# Patient Record
Sex: MALE | Race: WHITE | Employment: FULL TIME | URBAN - METROPOLITAN AREA
[De-identification: names, ages, dates, MRNs, and addresses within clinical notes are randomized per-mention and may not be internally consistent; named-entity substitution may affect disease eponyms.]

---

## 2022-09-26 ENCOUNTER — INITIAL CONSULT (OUTPATIENT)
Dept: ONCOLOGY | Age: 32
End: 2022-09-26
Payer: COMMERCIAL

## 2022-09-26 ENCOUNTER — HOSPITAL ENCOUNTER (OUTPATIENT)
Dept: INFUSION THERAPY | Age: 32
Discharge: HOME | End: 2022-09-26
Payer: COMMERCIAL

## 2022-09-26 VITALS
HEIGHT: 75 IN | BODY MASS INDEX: 33.94 KG/M2 | DIASTOLIC BLOOD PRESSURE: 73 MMHG | WEIGHT: 273 LBS | RESPIRATION RATE: 18 BRPM | TEMPERATURE: 97.6 F | HEART RATE: 88 BPM | OXYGEN SATURATION: 98 % | SYSTOLIC BLOOD PRESSURE: 141 MMHG

## 2022-09-26 DIAGNOSIS — C62.11 MALIGNANT NEOPLASM OF DESCENDED RIGHT TESTIS (HCC): Primary | ICD-10-CM

## 2022-09-26 DIAGNOSIS — C62.11 MALIGNANT NEOPLASM OF DESCENDED RIGHT TESTIS (HCC): ICD-10-CM

## 2022-09-26 LAB
ALBUMIN SERPL-MCNC: 4.8 GM/DL (ref 3.4–5)
ALP BLD-CCNC: 86 IU/L (ref 40–128)
ALT SERPL-CCNC: 43 U/L (ref 10–40)
ANION GAP SERPL CALCULATED.3IONS-SCNC: 12 MMOL/L (ref 4–16)
AST SERPL-CCNC: 26 IU/L (ref 15–37)
BASOPHILS ABSOLUTE: 0 K/CU MM
BASOPHILS RELATIVE PERCENT: 0.4 % (ref 0–1)
BILIRUB SERPL-MCNC: 0.5 MG/DL (ref 0–1)
BUN BLDV-MCNC: 10 MG/DL (ref 6–23)
CALCIUM SERPL-MCNC: 9.9 MG/DL (ref 8.3–10.6)
CHLORIDE BLD-SCNC: 102 MMOL/L (ref 99–110)
CO2: 25 MMOL/L (ref 21–32)
CREAT SERPL-MCNC: 0.9 MG/DL (ref 0.9–1.3)
DIFFERENTIAL TYPE: ABNORMAL
EOSINOPHILS ABSOLUTE: 0.2 K/CU MM
EOSINOPHILS RELATIVE PERCENT: 4.3 % (ref 0–3)
GFR AFRICAN AMERICAN: >60 ML/MIN/1.73M2
GFR NON-AFRICAN AMERICAN: >60 ML/MIN/1.73M2
GLUCOSE BLD-MCNC: 90 MG/DL (ref 70–99)
HCT VFR BLD CALC: 47.7 % (ref 42–52)
HEMOGLOBIN: 16 GM/DL (ref 13.5–18)
LACTATE DEHYDROGENASE: 159 IU/L (ref 120–246)
LYMPHOCYTES ABSOLUTE: 2.5 K/CU MM
LYMPHOCYTES RELATIVE PERCENT: 45.5 % (ref 24–44)
MCH RBC QN AUTO: 28.8 PG (ref 27–31)
MCHC RBC AUTO-ENTMCNC: 33.5 % (ref 32–36)
MCV RBC AUTO: 85.9 FL (ref 78–100)
MONOCYTES ABSOLUTE: 0.5 K/CU MM
MONOCYTES RELATIVE PERCENT: 8.2 % (ref 0–4)
PDW BLD-RTO: 12.8 % (ref 11.7–14.9)
PLATELET # BLD: 304 K/CU MM (ref 140–440)
PMV BLD AUTO: 9.4 FL (ref 7.5–11.1)
POTASSIUM SERPL-SCNC: 4.6 MMOL/L (ref 3.5–5.1)
RBC # BLD: 5.55 M/CU MM (ref 4.6–6.2)
SEGMENTED NEUTROPHILS ABSOLUTE COUNT: 2.3 K/CU MM
SEGMENTED NEUTROPHILS RELATIVE PERCENT: 41.6 % (ref 36–66)
SODIUM BLD-SCNC: 139 MMOL/L (ref 135–145)
TOTAL PROTEIN: 7.5 GM/DL (ref 6.4–8.2)
WBC # BLD: 5.6 K/CU MM (ref 4–10.5)

## 2022-09-26 PROCEDURE — 82105 ALPHA-FETOPROTEIN SERUM: CPT

## 2022-09-26 PROCEDURE — 99211 OFF/OP EST MAY X REQ PHY/QHP: CPT

## 2022-09-26 PROCEDURE — 85025 COMPLETE CBC W/AUTO DIFF WBC: CPT

## 2022-09-26 PROCEDURE — 36415 COLL VENOUS BLD VENIPUNCTURE: CPT

## 2022-09-26 PROCEDURE — 99215 OFFICE O/P EST HI 40 MIN: CPT | Performed by: INTERNAL MEDICINE

## 2022-09-26 PROCEDURE — 80053 COMPREHEN METABOLIC PANEL: CPT

## 2022-09-26 PROCEDURE — 83615 LACTATE (LD) (LDH) ENZYME: CPT

## 2022-09-26 PROCEDURE — 84702 CHORIONIC GONADOTROPIN TEST: CPT

## 2022-09-26 RX ORDER — OMEGA-3 FATTY ACIDS/FISH OIL 300-1000MG
1 CAPSULE ORAL PRN
COMMUNITY

## 2022-09-26 RX ORDER — LORATADINE 10 MG/1
10 TABLET ORAL PRN
COMMUNITY

## 2022-09-26 ASSESSMENT — PATIENT HEALTH QUESTIONNAIRE - PHQ9
2. FEELING DOWN, DEPRESSED OR HOPELESS: 0
SUM OF ALL RESPONSES TO PHQ QUESTIONS 1-9: 0
SUM OF ALL RESPONSES TO PHQ9 QUESTIONS 1 & 2: 0
SUM OF ALL RESPONSES TO PHQ QUESTIONS 1-9: 0
1. LITTLE INTEREST OR PLEASURE IN DOING THINGS: 0
SUM OF ALL RESPONSES TO PHQ QUESTIONS 1-9: 0
SUM OF ALL RESPONSES TO PHQ QUESTIONS 1-9: 0

## 2022-09-26 NOTE — PROGRESS NOTES
MA Rooming Questions  Patient: Deedee Henry  MRN: [id number]    Date: 9/26/2022        NP      5. Did the patient have a depression screening completed today?  Yes    PHQ-9 Total Score: 0 (9/26/2022  9:11 AM)       PHQ-9 Given to (if applicable):               PHQ-9 Score (if applicable):                     [] Positive     [x]  Negative              Does question #9 need addressed (if applicable)                     [] Yes    []  No               Alejandra Sierra MA

## 2022-09-26 NOTE — PROGRESS NOTES
Patient Name:  Abeba Rivera  Patient :  1990  Patient MRN:  [id number]     Primary Oncologist: Fatemeh Merchant MD  Referring Provider: No primary care provider on file. Date of Service: 2022      Reason for Consult: To evaluate the patient with mixed germ cell tumor. 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 He initially presented with right testicular pain on last week of 2022. Subsequent US scrotum on 22 showed 35 mm right testicular mass. His tumor marker before surgery on 22 were, AFP 6, beta HCG less than 5 and . 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 Tumor size was 3.5 cm in greatest dimension and confined to testis [pT1]. All the surgical margins are negative. There is no lymphovascular invasion. CT scan of the chest, abdomen and pelvis done on 2022 showed no focal forward CT findings of intra-abdominal or pelvic metastatic disease. Hepatic steatosis. He was subsequently referred to me for further evaluation. Past Medical History:     No pertinent medical history. Past Surgery History:    Significant for  1. Septoplasty  2. [place] teeth removal  3. Right orchiectomy    Social History:   He denies smoking or illicit drug abuse. He socially drinks alcohol [weekly]. Family History:    Significant for colon cancer in his maternal grandfather, prostate cancer and possibly renal cell carcinoma in his paternal grandfather. His paternal uncle has prostate cancer and testicular cancer.      Allergies:  No known drug allergy. Current Outpatient Medications on File Prior to Visit   Medication Sig Dispense Refill    loratadine (CLARITIN) 10 MG tablet Take 10 mg by mouth as needed      ibuprofen (ADVIL;MOTRIN) 200 MG CAPS Take 1 capsule by mouth as needed for Fever       No current facility-administered medications on file prior to visit. Review of Systems:  Constitutional:  No weight loss, No fever, No chills, No night sweats. Energy level good. Eyes:  No diplopia, No transient or permanent loss of vision, No scotomata. ENT / Mouth:  No epistaxis, No dysphagia, No hoarseness, No oral ulcers, No gingival bleeding. No sore throat, No postnasal drip, No nasal drip, No mouth pain, No sinus pain, No tinnitus, Normal hearing. Cardiovascular:  No chest pain, No palpitations, No syncope, No upper extremity edema, No lower extremity edema, No calf discomfort. Respiratory:  No cough. No hemoptysis, No pleurisy, No wheezing, No dyspnea. Gastrointestinal:  No abdominal pain, No abdominal cramping, No nausea, No vomiting, No constipation, No diarrhea, No hematochezia, No melena, No jaundice, No dyspepsia, No dysphagia. Urinary:  No dysuria, No hematuria, No urinary incontinence. Musculoskeletal:  No muscle pain, No swollen joints, No joint redness, No bone pain, No spine tenderness. Skin:  No rash, No nodules, No pruritus, No lesions. Neurologic:  No confusion, No seizures, No syncope, No tremor, No speech change, No headache, No hiccups, No abnormal gait, No sensory changes, No weakness. Psychiatric:  No depression, No anxiety, Concentration normal.  Endocrine:  No polyuria, No polydipsia, No hot flashes, No thyroid symptoms. Hematologic:  No epistaxis, No gingival bleeding, No petechiae, No ecchymosis. Lymphatic:  No lymphadenopathy, No lymphedema. Allergy / Immunologic:  No eczema, No frequent mucous infections, No frequent respiratory infections, No recurrent urticarial, No frequent skin infections. Vital Signs: BP (!) 141/73 (Site: Right Upper Arm, Position: Sitting, Cuff Size: Large Adult)   Pulse 88   Temp 97.6 °F (36.4 °C) (Infrared)   Resp 18   Ht 6' 3\" (1.905 m)   Wt 273 lb (123.8 kg)   SpO2 98%   BMI 34.12 kg/m²      Physical Exam:  CONSTITUTIONAL: awake, alert, cooperative, no apparent distress   EYES: pupils equal, round and reactive to light, sclera clear, normal conjunctiva  ENT: Normocephalic, without obvious abnormality, atraumatic  NECK: supple, symmetrical, no jugular venous distension, no carotid bruits   HEMATOLOGIC/LYMPHATIC: no cervical, supraclavicular or axillary lymphadenopathy   LUNGS: VBS, no wheezes, no increased work of breathing, no rhonchi, clear to auscultation, no crackles,    CARDIOVASCULAR: regular rate and rhythm, normal S1 and S2, no murmur noted  ABDOMEN: normal bowel sounds x 4, soft, non-distended, non-tender, no masses palpated, no hepatosplenomegaly   MUSCULOSKELETAL: full range of motion noted, tone is normal  NEUROLOGIC: awake, alert, oriented to name, place and time. Motor skills grossly intact. SKIN: appears intact, normal skin color, normal texture, normal turgor, no jaundice.    EXTREMITIES: no LE edema, no leg swelling, no cyanosis, no clubbing,       Labs:  Hematology:  No results found for: WBC, RBC, HGB, HCT, MCV, MCH, MCHC, RDW, PLT, MPV, BANDSPCT, SEGSPCT, EOSRELPCT, BASOPCT, LYMPHOPCT, MONOPCT, BANDABS, SEGSABS, EOSABS, BASOSABS, LYMPHSABS, MONOSABS, DIFFTYPE, ANISOCYTOSIS, POLYCHROM, WBCMORP, PLTM  No results found for: ESR  Chemistry:  No results found for: NA, K, CL, CO2, BUN, CREATININE, GLUCOSE, CALCIUM, PROT, LABALBU, BILITOT, ALKPHOS, AST, ALT, LABGLOM, GFRAA, AGRATIO, GLOB, PHOS, MG, POCCA, POCGLU  No results found for: MMA, LDH, HOMOCYSTEINE  No components found for: LD  No results found for: TSHHS, T4FREE, FT3  Immunology:  No results found for: PROT, SPEP, ALBUMINELP, LABALPH, LABBETA, GAMGLOB  No results found for: Ok Varner, KLFLCR  No results found for: B2M  Coagulation Panel:  No results found for: PROTIME, INR, APTT, DDIMER  Anemia Panel:  No results found for: [id number], FOLATE  Tumor Markers:  No results found for: , CEA, , LABCA2, PSA     Observations:  PHQ-9 Total Score: 0 (9/26/2022  9:11 AM)     Assessment   Right testicular mixed germ cell tumor     Plan:  Wilder AndradeIrasema Gonzalez is a 41-year-old very pleasant gentleman who initially presented with right testicular pain on last week of July 2022. Subsequent US scrotum on 8/22/22 showed 35 mm right testicular mass. His tumor marker before surgery on 8/24/22 were, AFP 6, beta HCG less than 5 and . 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 Tumor size was 3.5 cm in greatest dimension and confined to testis [pT1]. All the surgical margins are negative. There is no lymphovascular invasion. CT scan of the chest, abdomen and pelvis done on 9/20/2022 showed no focal forward CT findings of intra-abdominal or pelvic metastatic disease. Hepatic steatosis. 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 Since he only has stage I disease without risk factor, I recommend him to consider for surveillance. I also discussed with him about one cycle of chemotherapy with BEP as well as nerve-sparing RPLND. After long discussion with him and his father, he has decided for surveillance. He will need history, physical and tumor marker every 2 months in first year, every 2 months in second year, every 4-6 months in third year, every 6 months in fourth year and yearly in fifth year.  He will need CT abdomen/pelvis in every 4-6 months in first year, every 6 months in second year and annually in third year and as clinically indicated after that. He will need chest x ray in 4 months and 12 months in first year, annually in second, third and fourth year, and as clinically indicated after that. I will repeat his post orchiectomy tumor marker today and will follow up with the results. If they are negative, will plan to have repeat labs in 2 months. I answered all his questions and concerns for today. Thank you for allowing me to participate in the care of this very pleasant patient. Recent imaging and labs were reviewed and discussed with the patient.

## 2022-09-28 LAB — HCG TUMOR MARKER: <1 IU/L (ref 0–3)

## 2022-09-29 LAB — MS ALPHA-FETOPROTEIN: 4 NG/ML (ref 0–9)

## 2022-10-08 NOTE — PROGRESS NOTES
Patient Name:  Earnest Liang  Patient :  1990  Patient MRN:  [id number]     Primary Oncologist: Fabrizio Lemon MD  Referring Provider: No primary care provider on file. 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 He initially presented with right testicular pain on last week of 2022. Subsequent US scrotum on 22 showed 35 mm right testicular mass. His tumor marker before surgery on 22 were, AFP 6, beta HCG less than 5 and . 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 Tumor size was 3.5 cm in greatest dimension and confined to testis [pT1]. All the surgical margins are negative. There is no lymphovascular invasion. CT scan of the chest, abdomen and pelvis done on 2022 showed no focal forward CT findings of intra-abdominal or pelvic metastatic disease. Hepatic steatosis. 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 Since he only has stage I disease without risk factor, I recommend him to consider for surveillance. I also discussed with him about one cycle of chemotherapy with BEP as well as nerve-sparing RPLND. After long discussion with him and his father, he has decided for surveillance.      He will need history, physical and tumor marker every 2 months in first year, every 2 months in second year, every 4-6 months in third year, every 6 months in fourth year and yearly in fifth year. He will need CT abdomen/pelvis in every 4-6 months in first year, every 6 months in second year and annually in third year and as clinically indicated after that. He will need chest x ray in 4 months and 12 months in first year, annually in second, third and fourth year, and as clinically indicated after that. On October 12, 2022, he presented to me for follow-up. 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 History tumor marker done on 9/26/2022 (after surgery) were within normal range. I recommended to have repeat tumor markers in 2 months and I will see him again after that. I will continue to follow him as recommended by NCCN guidelines. He does not have any significant symptoms at today visit. Past Medical History:     No pertinent medical history. Past Surgery History:    Significant for  1. Septoplasty  2. [place] teeth removal  3. Right orchiectomy    Social History:   He denies smoking or illicit drug abuse. He socially drinks alcohol [weekly]. Family History:    Significant for colon cancer in his maternal grandfather, prostate cancer and possibly renal cell carcinoma in his paternal grandfather. His paternal uncle has prostate cancer and testicular cancer. Allergies:  No known drug allergy     Review of Systems: \"Per interval history; otherwise 10 point ROS is negative. \"  His energy level is pretty good and his sleep is fine. He doesn't have fever, chills, night sweats, cough, shortness of breath, chest pain, hemoptysis or palpitation. His bowels and bladder functions are normal. He denies nausea, vomiting, abdominal pain, diarrhea, constipation, dysuria, loss of appetite or weight loss. He doesn't have neuropathy and he denies bleeding or clotting issues. He doesn't have any pain in his body. Denies anxiety or depression. The rest of the systems are unremarkable.      Vital Signs: BP (!) 151/88 (Site: Right Upper Arm, Position: Sitting, Cuff Size: Large Adult)   Pulse (!) 114   Temp 98.1 °F (36.7 °C) (Infrared)   Resp 18   Ht 6' 3\" (1.905 m)   Wt 272 lb (123.4 kg)   SpO2 98%   BMI 34.00 kg/m²      Physical Exam:  CONSTITUTIONAL: awake, alert, cooperative, no apparent distress   EYES: pupils equal, round and reactive to light, sclera clear, normal conjunctiva  ENT: Normocephalic, without obvious abnormality, atraumatic  NECK: supple, symmetrical, no jugular venous distension, no carotid bruits   HEMATOLOGIC/LYMPHATIC: no cervical, supraclavicular or axillary lymphadenopathy   LUNGS: VBS, no wheezes, no increased work of breathing, no rhonchi, clear to auscultation, no crackles,    CARDIOVASCULAR: regular rate and rhythm, normal S1 and S2, no murmur noted  ABDOMEN: normal bowel sounds x 4, soft, non-distended, non-tender, no masses palpated, no hepatosplenomegaly   MUSCULOSKELETAL: full range of motion noted, tone is normal  NEUROLOGIC: awake, alert, oriented to name, place and time. Motor skills grossly intact. SKIN: appears intact, normal skin color, normal texture, normal turgor, no jaundice.    EXTREMITIES: no LE edema, no clubbing, no leg swelling, no cyanosis,        Labs:  Hematology:  Lab Results   Component Value Date    WBC 5.6 09/26/2022    RBC 5.55 09/26/2022    HGB 16.0 09/26/2022    HCT 47.7 09/26/2022    MCV 85.9 09/26/2022    MCH 28.8 09/26/2022    MCHC 33.5 09/26/2022    RDW 12.8 09/26/2022     09/26/2022    MPV 9.4 09/26/2022    SEGSPCT 41.6 09/26/2022    EOSRELPCT 4.3 (H) 09/26/2022    BASOPCT 0.4 09/26/2022    LYMPHOPCT 45.5 (H) 09/26/2022    MONOPCT 8.2 (H) 09/26/2022    SEGSABS 2.3 09/26/2022    EOSABS 0.2 09/26/2022    BASOSABS 0.0 09/26/2022    LYMPHSABS 2.5 09/26/2022    MONOSABS 0.5 09/26/2022    DIFFTYPE AUTOMATED DIFFERENTIAL 09/26/2022     No results found for: ESR  Chemistry:  Lab Results   Component Value Date     09/26/2022    K 4.6 09/26/2022  09/26/2022    CO2 25 09/26/2022    BUN 10 09/26/2022    CREATININE 0.9 09/26/2022    GLUCOSE 90 09/26/2022    CALCIUM 9.9 09/26/2022    PROT 7.5 09/26/2022    LABALBU 4.8 09/26/2022    BILITOT 0.5 09/26/2022    ALKPHOS 86 09/26/2022    AST 26 09/26/2022    ALT 43 (H) 09/26/2022    LABGLOM >60 09/26/2022    GFRAA >60 09/26/2022     Lab Results   Component Value Date     09/26/2022     No components found for: LD  No results found for: TSHHS, T4FREE, FT3  Immunology:  Lab Results   Component Value Date    PROT 7.5 09/26/2022     No results found for: Texie Miracle, KLFLCR  No results found for: B2M  Coagulation Panel:  No results found for: PROTIME, INR, APTT, DDIMER  Anemia Panel:  No results found for: [id number], FOLATE  Tumor Markers:  No results found for: , CEA, , LABCA2, PSA     Observations:  No data recorded     Assessment   Right testicular mixed germ cell tumor     Plan:  Emilee Carringtonjihan Gonzalez is a 81-year-old very pleasant gentleman who initially presented with right testicular pain on last week of July 2022. Subsequent US scrotum on 8/22/22 showed 35 mm right testicular mass. His tumor marker before surgery on 8/24/22 were, AFP 6, beta HCG less than 5 and . 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 Tumor size was 3.5 cm in greatest dimension and confined to testis [pT1]. All the surgical margins are negative. There is no lymphovascular invasion. CT scan of the chest, abdomen and pelvis done on 9/20/2022 showed no focal forward CT findings of intra-abdominal or pelvic metastatic disease. Hepatic steatosis. 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     Since he only has stage I disease without risk factor, I recommend him to consider for surveillance. I also discussed with him about one cycle of chemotherapy with BEP as well as nerve-sparing RPLND. After long discussion with him and his father, he has decided for surveillance. He will need history, physical and tumor marker every 2 months in first year, every 2 months in second year, every 4-6 months in third year, every 6 months in fourth year and yearly in fifth year. He will need CT abdomen/pelvis in every 4-6 months in first year, every 6 months in second year and annually in third year and as clinically indicated after that. He will need chest x ray in 4 months and 12 months in first year, annually in second, third and fourth year, and as clinically indicated after that. On October 12, 2022, he presented to me for follow-up. 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 History tumor marker done on 9/26/2022 (after surgery) were within normal range. I recommended to have repeat tumor markers in 2 months and I will see him again after that. I will continue to follow him as recommended by NCCN guidelines. I answered all his questions and concerns for today. Recent imaging and labs were reviewed and discussed with the patient.

## 2022-10-12 ENCOUNTER — HOSPITAL ENCOUNTER (OUTPATIENT)
Dept: INFUSION THERAPY | Age: 32
Discharge: HOME | End: 2022-10-12
Payer: COMMERCIAL

## 2022-10-12 ENCOUNTER — OFFICE VISIT (OUTPATIENT)
Dept: ONCOLOGY | Age: 32
End: 2022-10-12
Payer: COMMERCIAL

## 2022-10-12 VITALS
TEMPERATURE: 98.1 F | HEART RATE: 114 BPM | OXYGEN SATURATION: 98 % | RESPIRATION RATE: 18 BRPM | BODY MASS INDEX: 33.82 KG/M2 | DIASTOLIC BLOOD PRESSURE: 88 MMHG | SYSTOLIC BLOOD PRESSURE: 151 MMHG | HEIGHT: 75 IN | WEIGHT: 272 LBS

## 2022-10-12 DIAGNOSIS — C62.11 MALIGNANT NEOPLASM OF DESCENDED RIGHT TESTIS (HCC): Primary | ICD-10-CM

## 2022-10-12 PROCEDURE — 99211 OFF/OP EST MAY X REQ PHY/QHP: CPT

## 2022-10-12 PROCEDURE — 99213 OFFICE O/P EST LOW 20 MIN: CPT | Performed by: INTERNAL MEDICINE

## 2022-10-12 NOTE — PROGRESS NOTES
MA Rooming Questions  Patient: Edilia Link  MRN: [id number]    Date: 10/12/2022        1. Do you have any new issues?   no         2. Do you need any refills on medications?    no    3. Have you had any imaging done since your last visit?   no    4. Have you been hospitalized or seen in the emergency room since your last visit here?   no    5. Did the patient have a depression screening completed today?  No    No data recorded     PHQ-9 Given to (if applicable):               PHQ-9 Score (if applicable):                     [] Positive     []  Negative              Does question #9 need addressed (if applicable)                     [] Yes    []  No               Adilene Pickens MA

## 2022-12-06 ENCOUNTER — HOSPITAL ENCOUNTER (OUTPATIENT)
Dept: INFUSION THERAPY | Age: 32
Discharge: HOME | End: 2022-12-06
Payer: COMMERCIAL

## 2022-12-06 DIAGNOSIS — C62.11 MALIGNANT NEOPLASM OF DESCENDED RIGHT TESTIS (HCC): ICD-10-CM

## 2022-12-06 LAB
ALBUMIN SERPL-MCNC: 4.7 GM/DL (ref 3.4–5)
ALP BLD-CCNC: 74 IU/L (ref 40–129)
ALT SERPL-CCNC: 36 U/L (ref 10–40)
ANION GAP SERPL CALCULATED.3IONS-SCNC: 12 MMOL/L (ref 4–16)
AST SERPL-CCNC: 23 IU/L (ref 15–37)
BASOPHILS ABSOLUTE: 0 K/CU MM
BASOPHILS RELATIVE PERCENT: 0.7 % (ref 0–1)
BILIRUB SERPL-MCNC: 0.5 MG/DL (ref 0–1)
BUN BLDV-MCNC: 10 MG/DL (ref 6–23)
CALCIUM SERPL-MCNC: 9.8 MG/DL (ref 8.3–10.6)
CHLORIDE BLD-SCNC: 102 MMOL/L (ref 99–110)
CO2: 27 MMOL/L (ref 21–32)
CREAT SERPL-MCNC: 0.8 MG/DL (ref 0.9–1.3)
DIFFERENTIAL TYPE: ABNORMAL
EOSINOPHILS ABSOLUTE: 0.2 K/CU MM
EOSINOPHILS RELATIVE PERCENT: 3.9 % (ref 0–3)
GFR SERPL CREATININE-BSD FRML MDRD: >60 ML/MIN/1.73M2
GLUCOSE BLD-MCNC: 90 MG/DL (ref 70–99)
HCT VFR BLD CALC: 47.5 % (ref 42–52)
HEMOGLOBIN: 15.9 GM/DL (ref 13.5–18)
LACTATE DEHYDROGENASE: 154 IU/L (ref 120–246)
LYMPHOCYTES ABSOLUTE: 2.9 K/CU MM
LYMPHOCYTES RELATIVE PERCENT: 46.9 % (ref 24–44)
MCH RBC QN AUTO: 28.8 PG (ref 27–31)
MCHC RBC AUTO-ENTMCNC: 33.5 % (ref 32–36)
MCV RBC AUTO: 85.9 FL (ref 78–100)
MONOCYTES ABSOLUTE: 0.5 K/CU MM
MONOCYTES RELATIVE PERCENT: 7.5 % (ref 0–4)
PDW BLD-RTO: 12.6 % (ref 11.7–14.9)
PLATELET # BLD: 313 K/CU MM (ref 140–440)
PMV BLD AUTO: 9.3 FL (ref 7.5–11.1)
POTASSIUM SERPL-SCNC: 4.6 MMOL/L (ref 3.5–5.1)
RBC # BLD: 5.53 M/CU MM (ref 4.6–6.2)
SEGMENTED NEUTROPHILS ABSOLUTE COUNT: 2.5 K/CU MM
SEGMENTED NEUTROPHILS RELATIVE PERCENT: 41 % (ref 36–66)
SODIUM BLD-SCNC: 141 MMOL/L (ref 135–145)
TOTAL PROTEIN: 7.5 GM/DL (ref 6.4–8.2)
WBC # BLD: 6.1 K/CU MM (ref 4–10.5)

## 2022-12-06 PROCEDURE — 85025 COMPLETE CBC W/AUTO DIFF WBC: CPT

## 2022-12-06 PROCEDURE — 82105 ALPHA-FETOPROTEIN SERUM: CPT

## 2022-12-06 PROCEDURE — 36415 COLL VENOUS BLD VENIPUNCTURE: CPT

## 2022-12-06 PROCEDURE — 84702 CHORIONIC GONADOTROPIN TEST: CPT

## 2022-12-06 PROCEDURE — 83615 LACTATE (LD) (LDH) ENZYME: CPT

## 2022-12-06 PROCEDURE — 80053 COMPREHEN METABOLIC PANEL: CPT

## 2022-12-08 LAB
HCG TUMOR MARKER: <1 IU/L (ref 0–3)
MS ALPHA-FETOPROTEIN: 4 NG/ML (ref 0–9)

## 2022-12-13 ENCOUNTER — OFFICE VISIT (OUTPATIENT)
Dept: ONCOLOGY | Age: 32
End: 2022-12-13
Payer: COMMERCIAL

## 2022-12-13 ENCOUNTER — HOSPITAL ENCOUNTER (OUTPATIENT)
Dept: INFUSION THERAPY | Age: 32
Discharge: HOME | End: 2022-12-13
Payer: COMMERCIAL

## 2022-12-13 VITALS
RESPIRATION RATE: 18 BRPM | HEART RATE: 83 BPM | HEIGHT: 75 IN | TEMPERATURE: 98.1 F | OXYGEN SATURATION: 96 % | BODY MASS INDEX: 33.94 KG/M2 | DIASTOLIC BLOOD PRESSURE: 88 MMHG | WEIGHT: 273 LBS | SYSTOLIC BLOOD PRESSURE: 140 MMHG

## 2022-12-13 DIAGNOSIS — C62.11 MALIGNANT NEOPLASM OF DESCENDED RIGHT TESTIS (HCC): Primary | ICD-10-CM

## 2022-12-13 PROCEDURE — 99213 OFFICE O/P EST LOW 20 MIN: CPT | Performed by: INTERNAL MEDICINE

## 2022-12-13 PROCEDURE — 99211 OFF/OP EST MAY X REQ PHY/QHP: CPT

## 2022-12-13 NOTE — PROGRESS NOTES
MA Rooming Questions  Patient: Earnest Liang  MRN: [id number]    Date: 12/13/2022        1. Do you have any new issues?   no         2. Do you need any refills on medications?    no    3. Have you had any imaging done since your last visit?   no    4. Have you been hospitalized or seen in the emergency room since your last visit here?   no    5. Did the patient have a depression screening completed today?  No    No data recorded     PHQ-9 Given to (if applicable):               PHQ-9 Score (if applicable):                     [] Positive     []  Negative              Does question #9 need addressed (if applicable)                     [] Yes    []  No               Mark Benavides MA

## 2022-12-13 NOTE — PROGRESS NOTES
Patient Name:  Tamara Abreu  Patient :  1990  Patient MRN:  [id number]     Primary Oncologist: Corrine Wright MD  Referring Provider: No primary care provider on file. 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 He initially presented with right testicular pain on last week of 2022. Subsequent US scrotum on 22 showed 35 mm right testicular mass. His tumor marker before surgery on 22 were, AFP 6, beta HCG less than 5 and . 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 Tumor size was 3.5 cm in greatest dimension and confined to testis [pT1]. All the surgical margins are negative. There is no lymphovascular invasion. CT scan of the chest, abdomen and pelvis done on 2022 showed no focal forward CT findings of intra-abdominal or pelvic metastatic disease. Hepatic steatosis. 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 Since he only has stage I disease without risk factor, I recommend him to consider for surveillance. I also discussed with him about one cycle of chemotherapy with BEP as well as nerve-sparing RPLND. After long discussion with him and his father, he has decided for surveillance.      He will need history, physical and tumor marker every 2 months in first year, every 2 months in second year, every 4-6 months in third year, every 6 months in fourth year and yearly in fifth year. He will need CT abdomen/pelvis in every 4-6 months in first year, every 6 months in second year and annually in third year and as clinically indicated after that. He will need chest x ray in 4 months and 12 months in first year, annually in second, third and fourth year, and as clinically indicated after that. On December 13, 2022, he presented to me for follow-up. 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 History tumor marker done on 12/16/2022 were within normal range. I recommended to have repeat tumor markers in 2 months. He will be due for CT abdomen/pelvis and CXR (5 months after initial scan) in 2 months and I will schedule for them. I will see him again after that. I will continue to follow him as recommended by NCCN guidelines. He does not have any significant symptoms at today visit. Past Medical History:     No pertinent medical history. Past Surgery History:    Significant for  1. Septoplasty  2. [place] teeth removal  3. Right orchiectomy    Social History:   He denies smoking or illicit drug abuse. He socially drinks alcohol [weekly]. Family History:    Significant for colon cancer in his maternal grandfather, prostate cancer and possibly renal cell carcinoma in his paternal grandfather. His paternal uncle has prostate cancer and testicular cancer. Allergies:  No known drug allergy     Review of Systems: \"Per interval history; otherwise 10 point ROS is negative. \"  His energy level is good and his sleep is fine. He doesn't have fever, chills, night sweats, cough, shortness of breath, chest pain, hemoptysis or palpitation. His bowels and bladder functions are normal. He denies nausea, vomiting, abdominal pain, diarrhea, constipation, dysuria, loss of appetite or weight loss. He doesn't have neuropathy and he denies bleeding or clotting issues. He denies any pain in his body.  No anxiety or depression. The rest of the systems are unremarkable. Vital Signs: BP (!) 140/88 (Site: Right Upper Arm, Position: Sitting, Cuff Size: Large Adult)   Pulse 83   Temp 98.1 °F (36.7 °C) (Infrared)   Resp 18   Ht 6' 3\" (1.905 m)   Wt 273 lb (123.8 kg)   SpO2 96%   BMI 34.12 kg/m²      Physical Exam:  CONSTITUTIONAL: awake, alert, cooperative, no apparent distress   EYES: pupils equal, round and reactive to light, sclera clear, normal conjunctiva  ENT: Normocephalic, without obvious abnormality, atraumatic  NECK: supple, symmetrical, no jugular venous distension, no carotid bruits   HEMATOLOGIC/LYMPHATIC: no cervical, supraclavicular or axillary lymphadenopathy   LUNGS: VBS, no wheezes, no increased work of breathing, no rhonchi, clear to auscultation, no crackles,    CARDIOVASCULAR: regular rate and rhythm, normal S1 and S2, no murmur noted  ABDOMEN: normal bowel sounds x 4, soft, non-distended, non-tender, no masses palpated, no hepatosplenomegaly   MUSCULOSKELETAL: full range of motion noted, tone is normal  NEUROLOGIC: awake, alert, oriented to name, place and time. Motor skills grossly intact. SKIN: appears intact, normal skin color, normal texture, normal turgor, no jaundice.    EXTREMITIES: no clubbing, no leg swelling, no LE edema, no cyanosis,        Labs:  Hematology:  Lab Results   Component Value Date    WBC 6.1 12/06/2022    RBC 5.53 12/06/2022    HGB 15.9 12/06/2022    HCT 47.5 12/06/2022    MCV 85.9 12/06/2022    MCH 28.8 12/06/2022    MCHC 33.5 12/06/2022    RDW 12.6 12/06/2022     12/06/2022    MPV 9.3 12/06/2022    SEGSPCT 41.0 12/06/2022    EOSRELPCT 3.9 (H) 12/06/2022    BASOPCT 0.7 12/06/2022    LYMPHOPCT 46.9 (H) 12/06/2022    MONOPCT 7.5 (H) 12/06/2022    SEGSABS 2.5 12/06/2022    EOSABS 0.2 12/06/2022    BASOSABS 0.0 12/06/2022    LYMPHSABS 2.9 12/06/2022    MONOSABS 0.5 12/06/2022    DIFFTYPE AUTOMATED DIFFERENTIAL 12/06/2022     No results found for: ESR  Chemistry:  Lab Results   Component Value Date     12/06/2022    K 4.6 12/06/2022     12/06/2022    CO2 27 12/06/2022    BUN 10 12/06/2022    CREATININE 0.8 (L) 12/06/2022    GLUCOSE 90 12/06/2022    CALCIUM 9.8 12/06/2022    PROT 7.5 12/06/2022    LABALBU 4.7 12/06/2022    BILITOT 0.5 12/06/2022    ALKPHOS 74 12/06/2022    AST 23 12/06/2022    ALT 36 12/06/2022    LABGLOM >60 12/06/2022    GFRAA >60 09/26/2022     Lab Results   Component Value Date     12/06/2022     No components found for: LD  No results found for: TSHHS, T4FREE, FT3  Immunology:  Lab Results   Component Value Date    PROT 7.5 12/06/2022     No results found for: Matilda Macanese, KLFLCR  No results found for: B2M  Coagulation Panel:  No results found for: PROTIME, INR, APTT, DDIMER  Anemia Panel:  No results found for: [id number], FOLATE  Tumor Markers:  No results found for: , CEA, , LABCA2, PSA     Observations:  No data recorded     Assessment   Right testicular mixed germ cell tumor     Plan:  Savannah Gonzalez is a 26-year-old very pleasant gentleman who initially presented with right testicular pain on last week of July 2022. Subsequent US scrotum on 8/22/22 showed 35 mm right testicular mass. His tumor marker before surgery on 8/24/22 were, AFP 6, beta HCG less than 5 and . 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 Tumor size was 3.5 cm in greatest dimension and confined to testis [pT1]. All the surgical margins are negative. There is no lymphovascular invasion. CT scan of the chest, abdomen and pelvis done on 9/20/2022 showed no focal forward CT findings of intra-abdominal or pelvic metastatic disease. Hepatic steatosis.     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 Since he only has stage I disease without risk factor, I recommend him to consider for surveillance. I also discussed with him about one cycle of chemotherapy with BEP as well as nerve-sparing RPLND. After long discussion with him and his father, he has decided for surveillance. He will need history, physical and tumor marker every 2 months in first year, every 2 months in second year, every 4-6 months in third year, every 6 months in fourth year and yearly in fifth year. He will need CT abdomen/pelvis in every 4-6 months in first year, every 6 months in second year and annually in third year and as clinically indicated after that. He will need chest x ray in 4 months and 12 months in first year, annually in second, third and fourth year, and as clinically indicated after that. On December 13, 2022, he presented to me for follow-up. 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 History tumor marker done on 12/16/2022 were within normal range. I recommended to have repeat tumor markers in 2 months. He will be due for CT abdomen/pelvis and CXR (5 months after initial scan) in 2 months and I will schedule for them. I will see him again after that. I will continue to follow him as recommended by NCCN guidelines. I answered all his questions and concerns for today. Recent imaging and labs were reviewed and discussed with the patient.

## 2023-02-02 ENCOUNTER — CLINICAL DOCUMENTATION (OUTPATIENT)
Dept: ONCOLOGY | Age: 33
End: 2023-02-02

## 2023-02-14 ENCOUNTER — HOSPITAL ENCOUNTER (OUTPATIENT)
Dept: INFUSION THERAPY | Age: 33
Discharge: HOME | End: 2023-02-14
Payer: COMMERCIAL

## 2023-02-14 DIAGNOSIS — C62.11 MALIGNANT NEOPLASM OF DESCENDED RIGHT TESTIS (HCC): ICD-10-CM

## 2023-02-14 LAB
ALBUMIN SERPL-MCNC: 4.4 GM/DL (ref 3.4–5)
ALP BLD-CCNC: 75 IU/L (ref 40–128)
ALT SERPL-CCNC: 35 U/L (ref 10–40)
ANION GAP SERPL CALCULATED.3IONS-SCNC: 9 MMOL/L (ref 4–16)
AST SERPL-CCNC: 25 IU/L (ref 15–37)
BASOPHILS ABSOLUTE: 0 K/CU MM
BASOPHILS RELATIVE PERCENT: 0.5 % (ref 0–1)
BILIRUB SERPL-MCNC: 0.4 MG/DL (ref 0–1)
BUN SERPL-MCNC: 12 MG/DL (ref 6–23)
CALCIUM SERPL-MCNC: 9.3 MG/DL (ref 8.3–10.6)
CHLORIDE BLD-SCNC: 100 MMOL/L (ref 99–110)
CO2: 27 MMOL/L (ref 21–32)
CREAT SERPL-MCNC: 0.8 MG/DL (ref 0.9–1.3)
DIFFERENTIAL TYPE: ABNORMAL
EOSINOPHILS ABSOLUTE: 0.2 K/CU MM
EOSINOPHILS RELATIVE PERCENT: 3.5 % (ref 0–3)
GFR SERPL CREATININE-BSD FRML MDRD: >60 ML/MIN/1.73M2
GLUCOSE SERPL-MCNC: 84 MG/DL (ref 70–99)
HCT VFR BLD CALC: 44.7 % (ref 42–52)
HEMOGLOBIN: 15.1 GM/DL (ref 13.5–18)
LACTATE DEHYDROGENASE: 150 IU/L (ref 120–246)
LYMPHOCYTES ABSOLUTE: 2.7 K/CU MM
LYMPHOCYTES RELATIVE PERCENT: 45.2 % (ref 24–44)
MCH RBC QN AUTO: 28.9 PG (ref 27–31)
MCHC RBC AUTO-ENTMCNC: 33.8 % (ref 32–36)
MCV RBC AUTO: 85.6 FL (ref 78–100)
MONOCYTES ABSOLUTE: 0.6 K/CU MM
MONOCYTES RELATIVE PERCENT: 9.4 % (ref 0–4)
PDW BLD-RTO: 12.6 % (ref 11.7–14.9)
PLATELET # BLD: 284 K/CU MM (ref 140–440)
PMV BLD AUTO: 9.2 FL (ref 7.5–11.1)
POTASSIUM SERPL-SCNC: 4.4 MMOL/L (ref 3.5–5.1)
RBC # BLD: 5.22 M/CU MM (ref 4.6–6.2)
SEGMENTED NEUTROPHILS ABSOLUTE COUNT: 2.5 K/CU MM
SEGMENTED NEUTROPHILS RELATIVE PERCENT: 41.4 % (ref 36–66)
SODIUM BLD-SCNC: 136 MMOL/L (ref 135–145)
TOTAL PROTEIN: 6.8 GM/DL (ref 6.4–8.2)
WBC # BLD: 6 K/CU MM (ref 4–10.5)

## 2023-02-14 PROCEDURE — 84702 CHORIONIC GONADOTROPIN TEST: CPT

## 2023-02-14 PROCEDURE — 83615 LACTATE (LD) (LDH) ENZYME: CPT

## 2023-02-14 PROCEDURE — 82105 ALPHA-FETOPROTEIN SERUM: CPT

## 2023-02-14 PROCEDURE — 80053 COMPREHEN METABOLIC PANEL: CPT

## 2023-02-14 PROCEDURE — 36415 COLL VENOUS BLD VENIPUNCTURE: CPT

## 2023-02-14 PROCEDURE — 85025 COMPLETE CBC W/AUTO DIFF WBC: CPT

## 2023-02-15 LAB
AFP-TM SERPL-MCNC: 3 NG/ML (ref 0–9)
B-HCG SERPL-ACNC: <1 IU/L (ref 0–3)

## 2023-02-19 NOTE — PROGRESS NOTES
Patient Name:  Adan Banda  Patient :  1990  Patient MRN:  [id number]     Primary Oncologist: Sourav Aleman MD  Referring Provider: No primary care provider on file. 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 He initially presented with right testicular pain on last week of 2022. Subsequent US scrotum on 22 showed 35 mm right testicular mass. His tumor marker before surgery on 22 were, AFP 6, beta HCG less than 5 and . 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 Tumor size was 3.5 cm in greatest dimension and confined to testis [pT1]. All the surgical margins are negative. There is no lymphovascular invasion. CT scan of the chest, abdomen and pelvis done on 2022 showed no focal forward CT findings of intra-abdominal or pelvic metastatic disease. Hepatic steatosis. 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 Since he only has stage I disease without risk factor, I recommend him to consider for surveillance. I also discussed with him about one cycle of chemotherapy with BEP as well as nerve-sparing RPLND. After long discussion with him and his father, he has decided for surveillance.      He will need history, physical and tumor marker every 2 months in first year, every 2 months in second year, every 4-6 months in third year, every 6 months in fourth year and yearly in fifth year. He will need CT abdomen/pelvis in every 4-6 months in first year, every 6 months in second year and annually in third year and as clinically indicated after that. He will need chest x ray in 4 months and 12 months in first year, annually in second, third and fourth year, and as clinically indicated after that. CT abdomen/pelvis done on 2/6/2023 showed s/p right orchiectomy. No findings of metastatic disease. Stable, benign bilateral renal cyst.  Minimal colonic diverticulosis. Chest x-ray done on 2/6/2023 showed no radiographic findings of active cardiopulmonary disease. On February 21, 2023, he presented to me for follow-up. 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 His tumor marker done on 2/14/23 were within normal range. I recommended to have repeat tumor markers in 2 months. Reviewed with him findings on CT abdomen/pelvis and CXR done on 2/6/23. There is no sign of recurrent or metastatic disease noted on scans. I recommend him to have CT abdomen/pelvis and CXR in 4 - 6 months (his first year scans). I will continue to follow him as recommended by NCCN guidelines. He does not have any significant symptoms at today visit. Past Medical History:     No pertinent medical history. Past Surgery History:    Significant for  1. Septoplasty  2. [place] teeth removal  3. Right orchiectomy    Social History:   He denies smoking or illicit drug abuse. He socially drinks alcohol [weekly]. Family History:    Significant for colon cancer in his maternal grandfather, prostate cancer and possibly renal cell carcinoma in his paternal grandfather. His paternal uncle has prostate cancer and testicular cancer. Allergies:  No known drug allergy     Review of Systems: \"Per interval history; otherwise 10 point ROS is negative. \"  His energy level is pretty good today and his sleep is fine.  He doesn't have fever, chills, night sweats, cough, shortness of breath, chest pain, hemoptysis or palpitation. His bowels and bladder functions are normal. He denies nausea, vomiting, abdominal pain, diarrhea, constipation, dysuria, loss of appetite or weight loss. He denies neuropathy and he doesn't have bleeding or clotting issues. He denies any pain in his body. No anxiety or depression. The rest of the systems are unremarkable. Vital Signs: /88 (Site: Right Upper Arm, Position: Sitting, Cuff Size: Medium Adult)   Pulse 95   Temp 97.9 °F (36.6 °C) (Infrared)   Ht 6' 3\" (1.905 m)   Wt 279 lb (126.6 kg)   SpO2 95%   BMI 34.87 kg/m²      Physical Exam:  CONSTITUTIONAL: awake, alert, cooperative, no apparent distress   EYES: pupils equal, round and reactive to light, sclera clear, normal conjunctiva  ENT: Normocephalic, without obvious abnormality, atraumatic  NECK: supple, symmetrical, no jugular venous distension, no carotid bruits   HEMATOLOGIC/LYMPHATIC: no cervical, supraclavicular or axillary lymphadenopathy   LUNGS: VBS, no wheezes, clear to auscultation, no crackles, no increased work of breathing, no rhonchi,    CARDIOVASCULAR: regular rate and rhythm, normal S1 and S2, no murmur noted  ABDOMEN: normal bowel sounds x 4, soft, non-distended, non-tender, no masses palpated, no hepatosplenomegaly   MUSCULOSKELETAL: full range of motion noted, tone is normal  NEUROLOGIC: awake, alert, oriented to name, place and time. Motor skills grossly intact. SKIN: appears intact, normal skin color, normal texture, normal turgor, no jaundice.    EXTREMITIES: no leg swelling, no LE edema, no clubbing, no cyanosis,        Labs:  Hematology:  Lab Results   Component Value Date    WBC 6.0 02/14/2023    RBC 5.22 02/14/2023    HGB 15.1 02/14/2023    HCT 44.7 02/14/2023    MCV 85.6 02/14/2023    MCH 28.9 02/14/2023    MCHC 33.8 02/14/2023    RDW 12.6 02/14/2023     02/14/2023    MPV 9.2 02/14/2023    SEGSPCT 41.4 02/14/2023    EOSRELPCT 3.5 (H) 02/14/2023    BASOPCT 0.5 02/14/2023    LYMPHOPCT 45.2 (H) 02/14/2023    MONOPCT 9.4 (H) 02/14/2023    SEGSABS 2.5 02/14/2023    EOSABS 0.2 02/14/2023    BASOSABS 0.0 02/14/2023    LYMPHSABS 2.7 02/14/2023    MONOSABS 0.6 02/14/2023    DIFFTYPE AUTOMATED DIFFERENTIAL 02/14/2023     No results found for: ESR  Chemistry:  Lab Results   Component Value Date     02/14/2023    K 4.4 02/14/2023     02/14/2023    CO2 27 02/14/2023    BUN 12 02/14/2023    CREATININE 0.8 (L) 02/14/2023    GLUCOSE 84 02/14/2023    CALCIUM 9.3 02/14/2023    PROT 6.8 02/14/2023    LABALBU 4.4 02/14/2023    BILITOT 0.4 02/14/2023    ALKPHOS 75 02/14/2023    AST 25 02/14/2023    ALT 35 02/14/2023    LABGLOM >60 02/14/2023    GFRAA >60 09/26/2022     Lab Results   Component Value Date     02/14/2023     No components found for: LD  No results found for: TSHHS, T4FREE, FT3  Immunology:  Lab Results   Component Value Date    PROT 6.8 02/14/2023     No results found for: Lilliana Vides, KLFLCR  No results found for: B2M  Coagulation Panel:  No results found for: PROTIME, INR, APTT, DDIMER  Anemia Panel:  No results found for: [id number], FOLATE  Tumor Markers:  No results found for: , CEA, , LABCA2, PSA     Observations:  No data recorded     Assessment   Right testicular mixed germ cell tumor     Plan:  Makayla ShahIrasema Gonzalez is a 50-year-old very pleasant gentleman who initially presented with right testicular pain on last week of July 2022. Subsequent US scrotum on 8/22/22 showed 35 mm right testicular mass. His tumor marker before surgery on 8/24/22 were, AFP 6, beta HCG less than 5 and . 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 Tumor size was 3.5 cm in greatest dimension and confined to testis [pT1]. All the surgical margins are negative. There is no lymphovascular invasion. CT scan of the chest, abdomen and pelvis done on 9/20/2022 showed no focal forward CT findings of intra-abdominal or pelvic metastatic disease. Hepatic steatosis. 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 Since he only has stage I disease without risk factor, I recommend him to consider for surveillance. I also discussed with him about one cycle of chemotherapy with BEP as well as nerve-sparing RPLND. After long discussion with him and his father, he has decided for surveillance. He will need history, physical and tumor marker every 2 months in first year, every 2 months in second year, every 4-6 months in third year, every 6 months in fourth year and yearly in fifth year. He will need CT abdomen/pelvis in every 4-6 months in first year, every 6 months in second year and annually in third year and as clinically indicated after that. He will need chest x ray in 4 months and 12 months in first year, annually in second, third and fourth year, and as clinically indicated after that. CT abdomen/pelvis done on 2/6/2023 showed s/p right orchiectomy. No findings of metastatic disease. Stable, benign bilateral renal cyst.  Minimal colonic diverticulosis. Chest x-ray done on 2/6/2023 showed no radiographic findings of active cardiopulmonary disease. On February 21, 2023, he presented to me for follow-up. 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 His tumor marker done on 2/14/23 were within normal range. I recommended to have repeat tumor markers in 2 months. Reviewed with him findings on CT abdomen/pelvis and CXR done on 2/6/23. There is no sign of recurrent or metastatic disease noted on scans.      I recommend him to have CT abdomen/pelvis and CXR in 4 - 6 months (his first year scans). I will continue to follow him as recommended by NCCN guidelines. I answered all his questions and concerns for today. Recent imaging and labs were reviewed and discussed with the patient.

## 2023-02-21 ENCOUNTER — HOSPITAL ENCOUNTER (OUTPATIENT)
Dept: INFUSION THERAPY | Age: 33
Discharge: HOME | End: 2023-02-21
Payer: COMMERCIAL

## 2023-02-21 ENCOUNTER — OFFICE VISIT (OUTPATIENT)
Dept: ONCOLOGY | Age: 33
End: 2023-02-21
Payer: COMMERCIAL

## 2023-02-21 VITALS
DIASTOLIC BLOOD PRESSURE: 88 MMHG | HEIGHT: 75 IN | OXYGEN SATURATION: 95 % | SYSTOLIC BLOOD PRESSURE: 135 MMHG | BODY MASS INDEX: 34.69 KG/M2 | WEIGHT: 279 LBS | HEART RATE: 95 BPM | TEMPERATURE: 97.9 F

## 2023-02-21 DIAGNOSIS — C62.11 MALIGNANT NEOPLASM OF DESCENDED RIGHT TESTIS (HCC): Primary | ICD-10-CM

## 2023-02-21 PROCEDURE — 99211 OFF/OP EST MAY X REQ PHY/QHP: CPT

## 2023-02-21 PROCEDURE — 99213 OFFICE O/P EST LOW 20 MIN: CPT | Performed by: INTERNAL MEDICINE

## 2023-02-21 NOTE — PROGRESS NOTES
MA Rooming Questions  Patient: Viv Soto  MRN: [id number]    Date: 2/21/2023        1. Do you have any new issues?   no         2. Do you need any refills on medications?    no    3. Have you had any imaging done since your last visit? yes - CT scan, xray at [facility]    4. Have you been hospitalized or seen in the emergency room since your last visit here?   no    5. Did the patient have a depression screening completed today?  No    No data recorded     PHQ-9 Given to (if applicable):               PHQ-9 Score (if applicable):                     [] Positive     []  Negative              Does question #9 need addressed (if applicable)                     [] Yes    []  No               Maria Ines Lockett CMA

## 2023-04-14 ENCOUNTER — HOSPITAL ENCOUNTER (OUTPATIENT)
Dept: INFUSION THERAPY | Age: 33
Discharge: HOME | End: 2023-04-14
Payer: COMMERCIAL

## 2023-04-14 DIAGNOSIS — C62.11 MALIGNANT NEOPLASM OF DESCENDED RIGHT TESTIS (HCC): ICD-10-CM

## 2023-04-14 LAB
ALBUMIN SERPL-MCNC: 4.7 GM/DL (ref 3.4–5)
ALP BLD-CCNC: 83 IU/L (ref 40–129)
ALT SERPL-CCNC: 41 U/L (ref 10–40)
ANION GAP SERPL CALCULATED.3IONS-SCNC: 14 MMOL/L (ref 4–16)
AST SERPL-CCNC: 28 IU/L (ref 15–37)
BASOPHILS ABSOLUTE: 0 K/CU MM
BASOPHILS RELATIVE PERCENT: 0.3 % (ref 0–1)
BILIRUB SERPL-MCNC: 0.4 MG/DL (ref 0–1)
BUN SERPL-MCNC: 11 MG/DL (ref 6–23)
CALCIUM SERPL-MCNC: 9.6 MG/DL (ref 8.3–10.6)
CHLORIDE BLD-SCNC: 102 MMOL/L (ref 99–110)
CO2: 24 MMOL/L (ref 21–32)
CREAT SERPL-MCNC: 0.8 MG/DL (ref 0.9–1.3)
DIFFERENTIAL TYPE: ABNORMAL
EOSINOPHILS ABSOLUTE: 0.2 K/CU MM
EOSINOPHILS RELATIVE PERCENT: 4 % (ref 0–3)
GFR SERPL CREATININE-BSD FRML MDRD: >60 ML/MIN/1.73M2
GLUCOSE SERPL-MCNC: 86 MG/DL (ref 70–99)
HCT VFR BLD CALC: 48.2 % (ref 42–52)
HEMOGLOBIN: 16.2 GM/DL (ref 13.5–18)
LACTATE DEHYDROGENASE: 182 IU/L (ref 120–246)
LYMPHOCYTES ABSOLUTE: 2.7 K/CU MM
LYMPHOCYTES RELATIVE PERCENT: 45 % (ref 24–44)
MCH RBC QN AUTO: 28.8 PG (ref 27–31)
MCHC RBC AUTO-ENTMCNC: 33.6 % (ref 32–36)
MCV RBC AUTO: 85.8 FL (ref 78–100)
MONOCYTES ABSOLUTE: 0.6 K/CU MM
MONOCYTES RELATIVE PERCENT: 10 % (ref 0–4)
PDW BLD-RTO: 12.5 % (ref 11.7–14.9)
PLATELET # BLD: 300 K/CU MM (ref 140–440)
PMV BLD AUTO: 9.1 FL (ref 7.5–11.1)
POTASSIUM SERPL-SCNC: 4.8 MMOL/L (ref 3.5–5.1)
RBC # BLD: 5.62 M/CU MM (ref 4.6–6.2)
SEGMENTED NEUTROPHILS ABSOLUTE COUNT: 2.5 K/CU MM
SEGMENTED NEUTROPHILS RELATIVE PERCENT: 40.7 % (ref 36–66)
SODIUM BLD-SCNC: 140 MMOL/L (ref 135–145)
TOTAL PROTEIN: 7.4 GM/DL (ref 6.4–8.2)
WBC # BLD: 6 K/CU MM (ref 4–10.5)

## 2023-04-14 PROCEDURE — 82105 ALPHA-FETOPROTEIN SERUM: CPT

## 2023-04-14 PROCEDURE — 80053 COMPREHEN METABOLIC PANEL: CPT

## 2023-04-14 PROCEDURE — 36415 COLL VENOUS BLD VENIPUNCTURE: CPT

## 2023-04-14 PROCEDURE — 83615 LACTATE (LD) (LDH) ENZYME: CPT

## 2023-04-14 PROCEDURE — 84702 CHORIONIC GONADOTROPIN TEST: CPT

## 2023-04-14 PROCEDURE — 85025 COMPLETE CBC W/AUTO DIFF WBC: CPT

## 2023-04-15 LAB
AFP-TM SERPL-MCNC: 3 NG/ML (ref 0–9)
B-HCG SERPL-ACNC: <1 IU/L (ref 0–3)

## 2023-04-21 ENCOUNTER — OFFICE VISIT (OUTPATIENT)
Dept: ONCOLOGY | Age: 33
End: 2023-04-21
Payer: COMMERCIAL

## 2023-04-21 ENCOUNTER — HOSPITAL ENCOUNTER (OUTPATIENT)
Dept: INFUSION THERAPY | Age: 33
Discharge: HOME | End: 2023-04-21
Payer: COMMERCIAL

## 2023-04-21 VITALS
HEART RATE: 79 BPM | DIASTOLIC BLOOD PRESSURE: 79 MMHG | WEIGHT: 276.6 LBS | TEMPERATURE: 98.1 F | HEIGHT: 75 IN | SYSTOLIC BLOOD PRESSURE: 137 MMHG | RESPIRATION RATE: 18 BRPM | OXYGEN SATURATION: 97 % | BODY MASS INDEX: 34.39 KG/M2

## 2023-04-21 DIAGNOSIS — C62.11 MALIGNANT NEOPLASM OF DESCENDED RIGHT TESTIS (HCC): Primary | ICD-10-CM

## 2023-04-21 PROCEDURE — 99213 OFFICE O/P EST LOW 20 MIN: CPT | Performed by: INTERNAL MEDICINE

## 2023-04-21 PROCEDURE — 99211 OFF/OP EST MAY X REQ PHY/QHP: CPT

## 2023-04-21 RX ORDER — SULFAMETHOXAZOLE AND TRIMETHOPRIM 400; 80 MG/1; MG/1
TABLET ORAL
COMMUNITY

## 2023-04-21 NOTE — PROGRESS NOTES
MA Rooming Questions  Patient: Marcie Parra  MRN: [id number]    Date: 4/21/2023        1. Do you have any new issues?   no         2. Do you need any refills on medications?    no    3. Have you had any imaging done since your last visit?   no    4. Have you been hospitalized or seen in the emergency room since your last visit here?   no    5. Did the patient have a depression screening completed today?  No    No data recorded     PHQ-9 Given to (if applicable):               PHQ-9 Score (if applicable):                     [] Positive     []  Negative              Does question #9 need addressed (if applicable)                     [] Yes    []  No               Angle Orta CMA

## 2023-06-23 ENCOUNTER — HOSPITAL ENCOUNTER (OUTPATIENT)
Dept: INFUSION THERAPY | Age: 33
Discharge: HOME | End: 2023-06-23
Payer: COMMERCIAL

## 2023-06-23 DIAGNOSIS — C62.11 MALIGNANT NEOPLASM OF DESCENDED RIGHT TESTIS (HCC): ICD-10-CM

## 2023-06-23 LAB
ALBUMIN SERPL-MCNC: 4.7 GM/DL (ref 3.4–5)
ALP BLD-CCNC: 88 IU/L (ref 40–129)
ALT SERPL-CCNC: 42 U/L (ref 10–40)
ANION GAP SERPL CALCULATED.3IONS-SCNC: 12 MMOL/L (ref 4–16)
AST SERPL-CCNC: 27 IU/L (ref 15–37)
BASOPHILS ABSOLUTE: 0 K/CU MM
BASOPHILS RELATIVE PERCENT: 0.5 % (ref 0–1)
BILIRUB SERPL-MCNC: 0.3 MG/DL (ref 0–1)
BUN SERPL-MCNC: 10 MG/DL (ref 6–23)
CALCIUM SERPL-MCNC: 9.2 MG/DL (ref 8.3–10.6)
CHLORIDE BLD-SCNC: 103 MMOL/L (ref 99–110)
CO2: 26 MMOL/L (ref 21–32)
CREAT SERPL-MCNC: 0.8 MG/DL (ref 0.9–1.3)
DIFFERENTIAL TYPE: ABNORMAL
EOSINOPHILS ABSOLUTE: 0.2 K/CU MM
EOSINOPHILS RELATIVE PERCENT: 3.5 % (ref 0–3)
GFR SERPL CREATININE-BSD FRML MDRD: >60 ML/MIN/1.73M2
GLUCOSE SERPL-MCNC: 91 MG/DL (ref 70–99)
HCT VFR BLD CALC: 48.1 % (ref 42–52)
HEMOGLOBIN: 16 GM/DL (ref 13.5–18)
LACTATE DEHYDROGENASE: 153 IU/L (ref 120–246)
LYMPHOCYTES ABSOLUTE: 2.6 K/CU MM
LYMPHOCYTES RELATIVE PERCENT: 41.7 % (ref 24–44)
MCH RBC QN AUTO: 28.6 PG (ref 27–31)
MCHC RBC AUTO-ENTMCNC: 33.3 % (ref 32–36)
MCV RBC AUTO: 86 FL (ref 78–100)
MONOCYTES ABSOLUTE: 0.6 K/CU MM
MONOCYTES RELATIVE PERCENT: 9.2 % (ref 0–4)
PDW BLD-RTO: 12.6 % (ref 11.7–14.9)
PLATELET # BLD: 289 K/CU MM (ref 140–440)
PMV BLD AUTO: 9.6 FL (ref 7.5–11.1)
POTASSIUM SERPL-SCNC: 4.5 MMOL/L (ref 3.5–5.1)
RBC # BLD: 5.59 M/CU MM (ref 4.6–6.2)
SEGMENTED NEUTROPHILS ABSOLUTE COUNT: 2.8 K/CU MM
SEGMENTED NEUTROPHILS RELATIVE PERCENT: 45.1 % (ref 36–66)
SODIUM BLD-SCNC: 141 MMOL/L (ref 135–145)
TOTAL PROTEIN: 7.2 GM/DL (ref 6.4–8.2)
WBC # BLD: 6.3 K/CU MM (ref 4–10.5)

## 2023-06-23 PROCEDURE — 84702 CHORIONIC GONADOTROPIN TEST: CPT

## 2023-06-23 PROCEDURE — 36415 COLL VENOUS BLD VENIPUNCTURE: CPT

## 2023-06-23 PROCEDURE — 85025 COMPLETE CBC W/AUTO DIFF WBC: CPT

## 2023-06-23 PROCEDURE — 83615 LACTATE (LD) (LDH) ENZYME: CPT

## 2023-06-23 PROCEDURE — 80053 COMPREHEN METABOLIC PANEL: CPT

## 2023-06-23 PROCEDURE — 82105 ALPHA-FETOPROTEIN SERUM: CPT

## 2023-06-24 LAB — AFP-TM SERPL-MCNC: 3 NG/ML (ref 0–9)

## 2023-06-25 LAB — B-HCG SERPL-ACNC: <1 IU/L (ref 0–3)

## 2023-06-30 ENCOUNTER — HOSPITAL ENCOUNTER (OUTPATIENT)
Dept: INFUSION THERAPY | Age: 33
Discharge: HOME | End: 2023-06-30
Payer: COMMERCIAL

## 2023-06-30 ENCOUNTER — OFFICE VISIT (OUTPATIENT)
Dept: ONCOLOGY | Age: 33
End: 2023-06-30
Payer: COMMERCIAL

## 2023-06-30 VITALS
WEIGHT: 278 LBS | HEART RATE: 87 BPM | OXYGEN SATURATION: 96 % | DIASTOLIC BLOOD PRESSURE: 81 MMHG | BODY MASS INDEX: 34.57 KG/M2 | HEIGHT: 75 IN | TEMPERATURE: 97.7 F | SYSTOLIC BLOOD PRESSURE: 130 MMHG

## 2023-06-30 DIAGNOSIS — C62.11 MALIGNANT NEOPLASM OF DESCENDED RIGHT TESTIS (HCC): Primary | ICD-10-CM

## 2023-06-30 PROCEDURE — 99213 OFFICE O/P EST LOW 20 MIN: CPT | Performed by: INTERNAL MEDICINE

## 2023-06-30 PROCEDURE — 99211 OFF/OP EST MAY X REQ PHY/QHP: CPT

## 2023-07-05 ENCOUNTER — HOSPITAL ENCOUNTER (OUTPATIENT)
Dept: INFUSION THERAPY | Age: 33
Discharge: HOME | End: 2023-07-05
Payer: COMMERCIAL

## 2023-07-05 ENCOUNTER — OFFICE VISIT (OUTPATIENT)
Dept: ONCOLOGY | Age: 33
End: 2023-07-05

## 2023-07-05 VITALS — HEIGHT: 75 IN | WEIGHT: 278 LBS | BODY MASS INDEX: 34.57 KG/M2

## 2023-07-05 DIAGNOSIS — C62.11 MALIGNANT NEOPLASM OF DESCENDED RIGHT TESTIS (HCC): ICD-10-CM

## 2023-07-05 DIAGNOSIS — Z80.42 FAMILY HISTORY OF PROSTATE CANCER: ICD-10-CM

## 2023-07-05 DIAGNOSIS — Z80.8 FAMILY HISTORY OF GLIOBLASTOMA: ICD-10-CM

## 2023-07-05 DIAGNOSIS — Z71.83 ENCOUNTER FOR NONPROCREATIVE GENETIC COUNSELING: Primary | ICD-10-CM

## 2023-07-05 PROCEDURE — 99211 OFF/OP EST MAY X REQ PHY/QHP: CPT

## 2023-07-05 NOTE — PROGRESS NOTES
Hereditary Cancer Risk Assessment     Name: Danelle King   YOB: 1990   Date of Consultation: 23     Mr. Gonzalez was seen at the [facility] for genetic counseling on 23. Mr. Ramy Carroll was referred by Taryn Mejia MD due to his personal history of testicular cancer and family history including, prostate, colon, renal, and glioblastoma. PERSONAL HISTORY   Mr. Ramy Carroll is a 33 y.o.  male with personal history of testicular cancer. He reports:     No previous genetic counseling  No known familial mutation  Not adopted, Nor a twin  never Colonoscopy  Number of total polyps- n/a  Has never had EGD  Non Smoker  Etoh- 5-7 per week    FAMILY HISTORY    Mother: no known cancer, living, 64  Maternal Grandmother: no known cancer,  80  Maternal Grandfather: colon cancer, around 80,  80  Father: no known cancer,  61  Paternal Grandfather: prostate cancer, rectal cancer diagnosed 63's,  80  Paternal Grandmother:  No children  Sister: no known cancer, 39  Brother: no known cancer, 40  Brother: no known cancer, 39  Maternal Aunt: living, no known cancer, 72  Maternal Uncle: no known cancer, 79  Maternal Uncle: no known cancer, 59  Maternal great aunt, cervical cancer at middle age,  80  Paternal Aunt: no known cancer, living 61  Paternal Aunt: no known cancer, living 61  Paternal aunt- living, 62  Paternal aunt-living 54  Paternal Aunt: glioblastoma, dx 64,  64  Paternal Uncle: no known cancer, living 67  Paternal Uncle: non melanoma skin cancer, dx 42's, living at 64  Paternal Uncle: testicular, 32, prostate 52's, glioblastoma 72 (living 72)  No cancers in paternal first cousins    Mr. Gonzalez reports  ancestry and denies any known Ashkenazi Manda[place] heritage. RISK ASSESSMENT   We discussed that approximately 5-10% of cancers are due to a hereditary gene mutation which causes an increased risk for certain cancers.  Hereditary cancers are

## 2023-07-05 NOTE — PROGRESS NOTES
MA Rooming Questions  Patient: Ambar Lin  MRN: [id number]    Date: 7/5/2023          Pt here for a Genetic consult today  5. Did the patient have a depression screening completed today?  No    No data recorded     PHQ-9 Given to (if applicable):               PHQ-9 Score (if applicable):                     [] Positive     []  Negative              Does question #9 need addressed (if applicable)                     [] Yes    []  No               Orlando Encarnacion MA

## 2023-07-31 ENCOUNTER — CLINICAL DOCUMENTATION (OUTPATIENT)
Dept: ONCOLOGY | Age: 33
End: 2023-07-31

## 2023-07-31 DIAGNOSIS — Z71.83 ENCOUNTER FOR NONPROCREATIVE GENETIC COUNSELING: Primary | ICD-10-CM

## 2023-07-31 NOTE — PROGRESS NOTES
Attempted to call negative genetic results to patient, left message for patient to return call on VM.

## 2023-08-03 ENCOUNTER — CLINICAL DOCUMENTATION (OUTPATIENT)
Dept: ONCOLOGY | Age: 33
End: 2023-08-03

## 2023-08-03 DIAGNOSIS — Z71.83 ENCOUNTER FOR NONPROCREATIVE GENETIC COUNSELING: Primary | ICD-10-CM

## 2023-08-03 NOTE — PROGRESS NOTES
[address] [facility]   Hereditary Cancer Risk Assessment     Name: Yves Zhang  YOB: 1990  Date of Results Disclosure: 8/3/2023      HISTORY   Mr. Meryle Lieu was seen for genetic counseling at the request of Dr. Dee Hayden due to his personal and family history. At that time, Mr. Meryle Lieu chose to pursue genetic testing via the [facility] hereditary cancer gene panel. These results were discussed with Mr. Meryle Lieu via telephone. A summary of Mr. Kurtzs results and recommendations are below. RESULTS        We discussed that Mr. Gonzalez's negative test result greatly reduces the likelihood that his personal history of cancer is due to a hereditary mutation. It is possible that his personal history of cancer may be due to a gene for which testing was not performed or which has yet to be discovered. We discussed that Mr. Kurtzs negative test result greatly reduces the likelihood that he carries a hereditary gene mutation. However, it is possible that his family history of cancer is due to a hereditary mutation which he did not inherit. It is also possible that his family history of cancer may be due to a gene for which testing was not performed or which has yet to be discovered. RECOMMENDATIONS  1) The outcome of Mr. Kurtzs genetic test results do not affect his current cancer treatment. Mr. Meryle Lieu should continue cancer treatment and surveillance as directed by his physicians. 2) Mr. Meryle Lieu should continue all other cancer screening guidelines as directed by his physicians. RECOMMENDATIONS FOR FAMILY MEMBERS       1) It is possible that the cancers in Mr. Gonzalez's family are due to a hereditary gene mutation that he did not inherit. Therefore, his relatives (particularly those with a current or previous cancer diagnosis) may consider genetic counseling and testing to clarify this possibility.  Relatives may contact the [facility] at [phone] or

## 2023-08-22 ENCOUNTER — CLINICAL DOCUMENTATION (OUTPATIENT)
Dept: ONCOLOGY | Age: 33
End: 2023-08-22

## 2023-08-30 ENCOUNTER — HOSPITAL ENCOUNTER (OUTPATIENT)
Dept: INFUSION THERAPY | Age: 33
Discharge: HOME | End: 2023-08-30
Payer: COMMERCIAL

## 2023-08-30 DIAGNOSIS — C62.11 MALIGNANT NEOPLASM OF DESCENDED RIGHT TESTIS (HCC): ICD-10-CM

## 2023-08-30 LAB
ALBUMIN SERPL-MCNC: 4.6 GM/DL (ref 3.4–5)
ALP BLD-CCNC: 75 IU/L (ref 40–129)
ALT SERPL-CCNC: 37 U/L (ref 10–40)
ANION GAP SERPL CALCULATED.3IONS-SCNC: 11 MMOL/L (ref 4–16)
AST SERPL-CCNC: 25 IU/L (ref 15–37)
BASOPHILS ABSOLUTE: 0 K/CU MM
BASOPHILS RELATIVE PERCENT: 0.6 % (ref 0–1)
BILIRUB SERPL-MCNC: 0.6 MG/DL (ref 0–1)
BUN SERPL-MCNC: 10 MG/DL (ref 6–23)
CALCIUM SERPL-MCNC: 9.1 MG/DL (ref 8.3–10.6)
CHLORIDE BLD-SCNC: 102 MMOL/L (ref 99–110)
CO2: 26 MMOL/L (ref 21–32)
CREAT SERPL-MCNC: 0.8 MG/DL (ref 0.9–1.3)
DIFFERENTIAL TYPE: ABNORMAL
EOSINOPHILS ABSOLUTE: 0.2 K/CU MM
EOSINOPHILS RELATIVE PERCENT: 3.8 % (ref 0–3)
GFR SERPL CREATININE-BSD FRML MDRD: >60 ML/MIN/1.73M2
GLUCOSE SERPL-MCNC: 93 MG/DL (ref 70–99)
HCT VFR BLD CALC: 47.3 % (ref 42–52)
HEMOGLOBIN: 15.7 GM/DL (ref 13.5–18)
LACTATE DEHYDROGENASE: 164 IU/L (ref 120–246)
LYMPHOCYTES ABSOLUTE: 2 K/CU MM
LYMPHOCYTES RELATIVE PERCENT: 42.5 % (ref 24–44)
MCH RBC QN AUTO: 28.5 PG (ref 27–31)
MCHC RBC AUTO-ENTMCNC: 33.2 % (ref 32–36)
MCV RBC AUTO: 86 FL (ref 78–100)
MONOCYTES ABSOLUTE: 0.4 K/CU MM
MONOCYTES RELATIVE PERCENT: 8.8 % (ref 0–4)
PDW BLD-RTO: 12.5 % (ref 11.7–14.9)
PLATELET # BLD: 239 K/CU MM (ref 140–440)
PMV BLD AUTO: 9.8 FL (ref 7.5–11.1)
POTASSIUM SERPL-SCNC: 4.6 MMOL/L (ref 3.5–5.1)
RBC # BLD: 5.5 M/CU MM (ref 4.6–6.2)
SEGMENTED NEUTROPHILS ABSOLUTE COUNT: 2.1 K/CU MM
SEGMENTED NEUTROPHILS RELATIVE PERCENT: 44.3 % (ref 36–66)
SODIUM BLD-SCNC: 139 MMOL/L (ref 135–145)
TOTAL PROTEIN: 7.1 GM/DL (ref 6.4–8.2)
WBC # BLD: 4.8 K/CU MM (ref 4–10.5)

## 2023-08-30 PROCEDURE — 84702 CHORIONIC GONADOTROPIN TEST: CPT

## 2023-08-30 PROCEDURE — 82105 ALPHA-FETOPROTEIN SERUM: CPT

## 2023-08-30 PROCEDURE — 80053 COMPREHEN METABOLIC PANEL: CPT

## 2023-08-30 PROCEDURE — 85025 COMPLETE CBC W/AUTO DIFF WBC: CPT

## 2023-08-30 PROCEDURE — 83615 LACTATE (LD) (LDH) ENZYME: CPT

## 2023-08-30 PROCEDURE — 36415 COLL VENOUS BLD VENIPUNCTURE: CPT

## 2023-09-01 LAB
AFP-TM SERPL-MCNC: 3 NG/ML (ref 0–9)
B-HCG SERPL-ACNC: <1 IU/L (ref 0–3)

## 2023-09-06 ENCOUNTER — OFFICE VISIT (OUTPATIENT)
Dept: ONCOLOGY | Age: 33
End: 2023-09-06
Payer: COMMERCIAL

## 2023-09-06 ENCOUNTER — HOSPITAL ENCOUNTER (OUTPATIENT)
Dept: INFUSION THERAPY | Age: 33
Discharge: HOME | End: 2023-09-06
Payer: COMMERCIAL

## 2023-09-06 VITALS
WEIGHT: 282.8 LBS | HEART RATE: 95 BPM | BODY MASS INDEX: 35.16 KG/M2 | OXYGEN SATURATION: 94 % | DIASTOLIC BLOOD PRESSURE: 89 MMHG | HEIGHT: 75 IN | TEMPERATURE: 97.7 F | SYSTOLIC BLOOD PRESSURE: 138 MMHG

## 2023-09-06 DIAGNOSIS — C62.11 MALIGNANT NEOPLASM OF DESCENDED RIGHT TESTIS (HCC): Primary | ICD-10-CM

## 2023-09-06 PROCEDURE — 99213 OFFICE O/P EST LOW 20 MIN: CPT | Performed by: INTERNAL MEDICINE

## 2023-09-06 PROCEDURE — 99211 OFF/OP EST MAY X REQ PHY/QHP: CPT

## 2023-09-06 ASSESSMENT — PATIENT HEALTH QUESTIONNAIRE - PHQ9
SUM OF ALL RESPONSES TO PHQ QUESTIONS 1-9: 0
2. FEELING DOWN, DEPRESSED OR HOPELESS: 0
SUM OF ALL RESPONSES TO PHQ9 QUESTIONS 1 & 2: 0
1. LITTLE INTEREST OR PLEASURE IN DOING THINGS: 0

## 2023-09-06 NOTE — PROGRESS NOTES
MA Rooming Questions  Patient: Gaye Payan  MRN: [id number]    Date: 9/6/2023        1. Do you have any new issues?   no         2. Do you need any refills on medications?    no    3. Have you had any imaging done since your last visit? yes - CT scan and chest xray at [facility]    4. Have you been hospitalized or seen in the emergency room since your last visit here?   no    5. Did the patient have a depression screening completed today?  Yes    PHQ-9 Total Score: 0 (9/6/2023 10:05 AM)       PHQ-9 Given to (if applicable):               PHQ-9 Score (if applicable):                     [] Positive     [x]  Negative              Does question #9 need addressed (if applicable)                     [] Yes    []  No               Osbaldo Amanda CMA

## 2023-09-06 NOTE — PROGRESS NOTES
Patient Name:  Bhavin Wong  Patient :  1990  Patient MRN:  [id number]     Primary Oncologist: Ermelinda Drew MD  Referring Provider: No primary care provider on file. 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 He initially presented with right testicular pain on last week of 2022. Subsequent US scrotum on 22 showed 35 mm right testicular mass. His tumor marker before surgery on 22 were, AFP 6, beta HCG less than 5 and . 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 Tumor size was 3.5 cm in greatest dimension and confined to testis [pT1]. All the surgical margins are negative. There is no lymphovascular invasion. CT scan of the chest, abdomen and pelvis done on 2022 showed no focal forward CT findings of intra-abdominal or pelvic metastatic disease. Hepatic steatosis. 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 Since he only has stage I disease without risk factor, I recommend him to consider for surveillance. I also discussed with him about one cycle of chemotherapy with BEP as well as nerve-sparing RPLND. After long discussion with him and his father, he has decided for surveillance.      He will need history, physical and tumor marker every 2 months in first year, every 3 months in second year, every 4-6 months in third year, every 6 months in fourth year and yearly

## 2023-12-12 ENCOUNTER — HOSPITAL ENCOUNTER (OUTPATIENT)
Dept: INFUSION THERAPY | Age: 33
Discharge: HOME | End: 2023-12-12
Payer: COMMERCIAL

## 2023-12-12 DIAGNOSIS — C62.11 MALIGNANT NEOPLASM OF DESCENDED RIGHT TESTIS (HCC): ICD-10-CM

## 2023-12-12 LAB
ALBUMIN SERPL-MCNC: 4.6 GM/DL (ref 3.4–5)
ALP BLD-CCNC: 77 IU/L (ref 40–129)
ALT SERPL-CCNC: 42 U/L (ref 10–40)
ANION GAP SERPL CALCULATED.3IONS-SCNC: 12 MMOL/L (ref 4–16)
AST SERPL-CCNC: 25 IU/L (ref 15–37)
BASOPHILS ABSOLUTE: 0 K/CU MM
BASOPHILS RELATIVE PERCENT: 0.5 % (ref 0–1)
BILIRUB SERPL-MCNC: 0.3 MG/DL (ref 0–1)
BUN SERPL-MCNC: 14 MG/DL (ref 6–23)
CALCIUM SERPL-MCNC: 9.4 MG/DL (ref 8.3–10.6)
CHLORIDE BLD-SCNC: 103 MMOL/L (ref 99–110)
CO2: 26 MMOL/L (ref 21–32)
CREAT SERPL-MCNC: 0.9 MG/DL (ref 0.9–1.3)
DIFFERENTIAL TYPE: ABNORMAL
EOSINOPHILS ABSOLUTE: 0.2 K/CU MM
EOSINOPHILS RELATIVE PERCENT: 4.1 % (ref 0–3)
GFR SERPL CREATININE-BSD FRML MDRD: >60 ML/MIN/1.73M2
GLUCOSE SERPL-MCNC: 96 MG/DL (ref 70–99)
HCT VFR BLD CALC: 46.7 % (ref 42–52)
HEMOGLOBIN: 15.7 GM/DL (ref 13.5–18)
LACTATE DEHYDROGENASE: 153 IU/L (ref 120–246)
LYMPHOCYTES ABSOLUTE: 2.6 K/CU MM
LYMPHOCYTES RELATIVE PERCENT: 45.6 % (ref 24–44)
MCH RBC QN AUTO: 28.6 PG (ref 27–31)
MCHC RBC AUTO-ENTMCNC: 33.6 % (ref 32–36)
MCV RBC AUTO: 85.2 FL (ref 78–100)
MONOCYTES ABSOLUTE: 0.5 K/CU MM
MONOCYTES RELATIVE PERCENT: 9.7 % (ref 0–4)
PDW BLD-RTO: 12.5 % (ref 11.7–14.9)
PLATELET # BLD: 302 K/CU MM (ref 140–440)
PMV BLD AUTO: 9.3 FL (ref 7.5–11.1)
POTASSIUM SERPL-SCNC: 4.7 MMOL/L (ref 3.5–5.1)
RBC # BLD: 5.48 M/CU MM (ref 4.6–6.2)
SEGMENTED NEUTROPHILS ABSOLUTE COUNT: 2.2 K/CU MM
SEGMENTED NEUTROPHILS RELATIVE PERCENT: 40.1 % (ref 36–66)
SODIUM BLD-SCNC: 141 MMOL/L (ref 135–145)
TOTAL PROTEIN: 7.4 GM/DL (ref 6.4–8.2)
WBC # BLD: 5.6 K/CU MM (ref 4–10.5)

## 2023-12-12 PROCEDURE — 82105 ALPHA-FETOPROTEIN SERUM: CPT

## 2023-12-12 PROCEDURE — 83615 LACTATE (LD) (LDH) ENZYME: CPT

## 2023-12-12 PROCEDURE — 84702 CHORIONIC GONADOTROPIN TEST: CPT

## 2023-12-12 PROCEDURE — 80053 COMPREHEN METABOLIC PANEL: CPT

## 2023-12-12 PROCEDURE — 85025 COMPLETE CBC W/AUTO DIFF WBC: CPT

## 2023-12-12 PROCEDURE — 36415 COLL VENOUS BLD VENIPUNCTURE: CPT

## 2023-12-14 LAB
AFP-TM SERPL-MCNC: 3 NG/ML (ref 0–9)
B-HCG SERPL-ACNC: <1 IU/L (ref 0–3)

## 2024-05-13 ENCOUNTER — HOSPITAL ENCOUNTER (OUTPATIENT)
Dept: INFUSION THERAPY | Age: 34
Discharge: HOME | End: 2024-05-13
Payer: COMMERCIAL

## 2024-05-13 ENCOUNTER — OFFICE VISIT (OUTPATIENT)
Dept: ONCOLOGY | Age: 34
End: 2024-05-13
Payer: COMMERCIAL

## 2024-05-13 VITALS
HEIGHT: 75 IN | TEMPERATURE: 97.8 F | SYSTOLIC BLOOD PRESSURE: 131 MMHG | DIASTOLIC BLOOD PRESSURE: 63 MMHG | OXYGEN SATURATION: 96 % | RESPIRATION RATE: 18 BRPM | HEART RATE: 77 BPM | BODY MASS INDEX: 34.76 KG/M2 | WEIGHT: 279.6 LBS

## 2024-05-13 DIAGNOSIS — Z80.8 FAMILY HISTORY OF GLIOBLASTOMA: ICD-10-CM

## 2024-05-13 DIAGNOSIS — Z71.83 ENCOUNTER FOR NONPROCREATIVE GENETIC COUNSELING: ICD-10-CM

## 2024-05-13 DIAGNOSIS — C62.11 MALIGNANT NEOPLASM OF DESCENDED RIGHT TESTIS (HCC): Primary | ICD-10-CM

## 2024-05-13 DIAGNOSIS — Z80.42 FAMILY HISTORY OF PROSTATE CANCER: ICD-10-CM

## 2024-05-13 DIAGNOSIS — C62.11 MALIGNANT NEOPLASM OF DESCENDED RIGHT TESTIS (HCC): ICD-10-CM

## 2024-05-13 LAB
ALBUMIN SERPL-MCNC: 4.5 GM/DL (ref 3.4–5)
ALP BLD-CCNC: 93 IU/L (ref 40–129)
ALT SERPL-CCNC: 36 U/L (ref 10–40)
ANION GAP SERPL CALCULATED.3IONS-SCNC: 10 MMOL/L (ref 7–16)
AST SERPL-CCNC: 22 IU/L (ref 15–37)
BASOPHILS ABSOLUTE: 0 K/CU MM
BASOPHILS RELATIVE PERCENT: 0.2 % (ref 0–1)
BILIRUB SERPL-MCNC: 0.2 MG/DL (ref 0–1)
BUN SERPL-MCNC: 9 MG/DL (ref 6–23)
CALCIUM SERPL-MCNC: 9.1 MG/DL (ref 8.3–10.6)
CHLORIDE BLD-SCNC: 104 MMOL/L (ref 99–110)
CO2: 26 MMOL/L (ref 21–32)
CREAT SERPL-MCNC: 0.9 MG/DL (ref 0.9–1.3)
DIFFERENTIAL TYPE: ABNORMAL
EOSINOPHILS ABSOLUTE: 0.2 K/CU MM
EOSINOPHILS RELATIVE PERCENT: 4.5 % (ref 0–3)
GFR, ESTIMATED: >90 ML/MIN/1.73M2
GLUCOSE SERPL-MCNC: 101 MG/DL (ref 70–99)
HCT VFR BLD CALC: 46.6 % (ref 42–52)
HEMOGLOBIN: 15.6 GM/DL (ref 13.5–18)
LACTATE DEHYDROGENASE: 141 IU/L (ref 120–246)
LYMPHOCYTES ABSOLUTE: 2.3 K/CU MM
LYMPHOCYTES RELATIVE PERCENT: 45.1 % (ref 24–44)
MCH RBC QN AUTO: 28.6 PG (ref 27–31)
MCHC RBC AUTO-ENTMCNC: 33.5 % (ref 32–36)
MCV RBC AUTO: 85.5 FL (ref 78–100)
MONOCYTES ABSOLUTE: 0.5 K/CU MM
MONOCYTES RELATIVE PERCENT: 9.3 % (ref 0–4)
NEUTROPHILS ABSOLUTE: 2.1 K/CU MM
NEUTROPHILS RELATIVE PERCENT: 40.9 % (ref 36–66)
PDW BLD-RTO: 12.5 % (ref 11.7–14.9)
PLATELET # BLD: 285 K/CU MM (ref 140–440)
PMV BLD AUTO: 9.7 FL (ref 7.5–11.1)
POTASSIUM SERPL-SCNC: 4.6 MMOL/L (ref 3.5–5.1)
RBC # BLD: 5.45 M/CU MM (ref 4.6–6.2)
SODIUM BLD-SCNC: 140 MMOL/L (ref 135–145)
TOTAL PROTEIN: 7.2 GM/DL (ref 6.4–8.2)
WBC # BLD: 5.1 K/CU MM (ref 4–10.5)

## 2024-05-13 PROCEDURE — 36415 COLL VENOUS BLD VENIPUNCTURE: CPT

## 2024-05-13 PROCEDURE — 85025 COMPLETE CBC W/AUTO DIFF WBC: CPT

## 2024-05-13 PROCEDURE — 99213 OFFICE O/P EST LOW 20 MIN: CPT | Performed by: INTERNAL MEDICINE

## 2024-05-13 PROCEDURE — 83615 LACTATE (LD) (LDH) ENZYME: CPT

## 2024-05-13 PROCEDURE — 82105 ALPHA-FETOPROTEIN SERUM: CPT

## 2024-05-13 PROCEDURE — 99211 OFF/OP EST MAY X REQ PHY/QHP: CPT

## 2024-05-13 PROCEDURE — 80053 COMPREHEN METABOLIC PANEL: CPT

## 2024-05-13 PROCEDURE — 84702 CHORIONIC GONADOTROPIN TEST: CPT

## 2024-05-13 ASSESSMENT — PATIENT HEALTH QUESTIONNAIRE - PHQ9
SUM OF ALL RESPONSES TO PHQ QUESTIONS 1-9: 0
SUM OF ALL RESPONSES TO PHQ9 QUESTIONS 1 & 2: 0
SUM OF ALL RESPONSES TO PHQ QUESTIONS 1-9: 0
2. FEELING DOWN, DEPRESSED OR HOPELESS: NOT AT ALL
SUM OF ALL RESPONSES TO PHQ QUESTIONS 1-9: 0
1. LITTLE INTEREST OR PLEASURE IN DOING THINGS: NOT AT ALL
SUM OF ALL RESPONSES TO PHQ QUESTIONS 1-9: 0

## 2024-05-13 NOTE — PROGRESS NOTES
MA Rooming Questions  Patient: Kenny Gonzalez  MRN: [id number]    Date: 5/13/2024        1. Do you have any new issues?   no         2. Do you need any refills on medications?    no    3. Have you had any imaging done since your last visit?   no    4. Have you been hospitalized or seen in the emergency room since your last visit here?   no    5. Did the patient have a depression screening completed today? Yes    PHQ-9 Total Score: 0 (5/13/2024  9:28 AM)       PHQ-9 Given to (if applicable):               PHQ-9 Score (if applicable):                     [] Positive     []  Negative              Does question #9 need addressed (if applicable)                     [] Yes    []  No               Ronan Paz MA      
Motor skills grossly intact.   SKIN: appears intact, normal skin color, normal texture, normal turgor, no jaundice.   EXTREMITIES: no LE edema, no clubbing, no leg swelling, no cyanosis,        Labs:  Hematology:  Lab Results   Component Value Date    WBC 5.6 12/12/2023    RBC 5.48 12/12/2023    HGB 15.7 12/12/2023    HCT 46.7 12/12/2023    MCV 85.2 12/12/2023    MCH 28.6 12/12/2023    MCHC 33.6 12/12/2023    RDW 12.5 12/12/2023     12/12/2023    MPV 9.3 12/12/2023    BASOPCT 0.5 12/12/2023    LYMPHOPCT 45.6 (H) 12/12/2023    MONOPCT 9.7 (H) 12/12/2023    EOSABS 0.2 12/12/2023    BASOSABS 0.0 12/12/2023    MONOSABS 0.5 12/12/2023    DIFFTYPE AUTOMATED DIFFERENTIAL 12/12/2023     No results found for: \"ESR\"  Chemistry:  Lab Results   Component Value Date     12/12/2023    K 4.7 12/12/2023     12/12/2023    CO2 26 12/12/2023    BUN 14 12/12/2023    CREATININE 0.9 12/12/2023    GLUCOSE 96 12/12/2023    CALCIUM 9.4 12/12/2023    BILITOT 0.3 12/12/2023    ALKPHOS 77 12/12/2023    AST 25 12/12/2023    ALT 42 (H) 12/12/2023    LABGLOM >60 12/12/2023    GFRAA >60 09/26/2022     Lab Results   Component Value Date     12/12/2023     No results found for: \"LD\"  No results found for: \"TSHHS\", \"T4FREE\", \"FT3\"  Immunology:  No results found for: \"PROT\", \"SPEP\", \"ALBUMINELP\", \"LABALPH\", \"LABBETA\", \"GAMGLOB\"    No results found for: \"KAPPAUVOL\", \"LAMBDAUVOL\", \"KLFLCR\"  No results found for: \"B2M\"  Coagulation Panel:  No results found for: \"PROTIME\", \"INR\", \"APTT\", \"DDIMER\"  Anemia Panel:  No results found for: \"MZSLEDVG63\", \"FOLATE\"  Tumor Markers:  No results found for: \"\", \"CEA\", \"\", \"LABCA2\", \"PSA\"     Observations:  PHQ-9 Total Score: 0 (5/13/2024  9:28 AM)      Assessment   Right testicular mixed germ cell tumor     Plan:  Kenny Gonzalez is a 33-year-old very pleasant gentleman who initially presented with right testicular pain on last week of July 2022. Subsequent US scrotum on 8/22/22

## 2024-05-14 LAB — AFP-TM SERPL-MCNC: 3 NG/ML (ref 0–9)

## 2024-05-15 LAB — B-HCG SERPL-ACNC: <1 IU/L (ref 0–3)

## 2024-08-09 ENCOUNTER — TELEPHONE (OUTPATIENT)
Dept: ONCOLOGY | Age: 34
End: 2024-08-09

## 2024-08-09 NOTE — TELEPHONE ENCOUNTER
8/9/24 - pt called and stated he was scheduled for CT AP thru [facility] on 8/23/24 and he would like to go to [facility]. I CX the appt thru [facility] and faxed CT AP order/office notes to [facility].-successful fax    8/9/24 faxed XR chest order to [facility] for scheduling - successful fax

## 2024-08-23 ENCOUNTER — HOSPITAL ENCOUNTER (OUTPATIENT)
Dept: INFUSION THERAPY | Age: 34
Discharge: HOME | End: 2024-08-23
Payer: COMMERCIAL

## 2024-08-23 DIAGNOSIS — Z71.83 ENCOUNTER FOR NONPROCREATIVE GENETIC COUNSELING: ICD-10-CM

## 2024-08-23 DIAGNOSIS — C62.11 MALIGNANT NEOPLASM OF DESCENDED RIGHT TESTIS (HCC): ICD-10-CM

## 2024-08-23 DIAGNOSIS — Z80.8 FAMILY HISTORY OF GLIOBLASTOMA: ICD-10-CM

## 2024-08-23 DIAGNOSIS — Z80.42 FAMILY HISTORY OF PROSTATE CANCER: ICD-10-CM

## 2024-08-23 LAB
ALBUMIN SERPL-MCNC: 4.5 GM/DL (ref 3.4–5)
ALP BLD-CCNC: 87 IU/L (ref 40–129)
ALT SERPL-CCNC: 40 U/L (ref 10–40)
ANION GAP SERPL CALCULATED.3IONS-SCNC: 12 MMOL/L (ref 7–16)
AST SERPL-CCNC: 21 IU/L (ref 15–37)
BASOPHILS ABSOLUTE: 0 K/CU MM
BASOPHILS RELATIVE PERCENT: 0.5 % (ref 0–1)
BILIRUB SERPL-MCNC: 0.3 MG/DL (ref 0–1)
BUN SERPL-MCNC: 15 MG/DL (ref 6–23)
CALCIUM SERPL-MCNC: 9.4 MG/DL (ref 8.3–10.6)
CHLORIDE BLD-SCNC: 102 MMOL/L (ref 99–110)
CO2: 26 MMOL/L (ref 21–32)
CREAT SERPL-MCNC: 0.8 MG/DL (ref 0.9–1.3)
DIFFERENTIAL TYPE: ABNORMAL
EOSINOPHILS ABSOLUTE: 0.2 K/CU MM
EOSINOPHILS RELATIVE PERCENT: 3.3 % (ref 0–3)
GFR, ESTIMATED: >90 ML/MIN/1.73M2
GLUCOSE SERPL-MCNC: 88 MG/DL (ref 70–99)
HCT VFR BLD CALC: 46.7 % (ref 42–52)
HEMOGLOBIN: 15.7 GM/DL (ref 13.5–18)
LACTATE DEHYDROGENASE: 162 IU/L (ref 120–246)
LYMPHOCYTES ABSOLUTE: 2.8 K/CU MM
LYMPHOCYTES RELATIVE PERCENT: 41.9 % (ref 24–44)
MCH RBC QN AUTO: 28.8 PG (ref 27–31)
MCHC RBC AUTO-ENTMCNC: 33.6 % (ref 32–36)
MCV RBC AUTO: 85.5 FL (ref 78–100)
MONOCYTES ABSOLUTE: 0.7 K/CU MM
MONOCYTES RELATIVE PERCENT: 9.9 % (ref 0–4)
NEUTROPHILS ABSOLUTE: 2.9 K/CU MM
NEUTROPHILS RELATIVE PERCENT: 44.4 % (ref 36–66)
PDW BLD-RTO: 12.6 % (ref 11.7–14.9)
PLATELET # BLD: 300 K/CU MM (ref 140–440)
PMV BLD AUTO: 9.2 FL (ref 7.5–11.1)
POTASSIUM SERPL-SCNC: 4.6 MMOL/L (ref 3.5–5.1)
RBC # BLD: 5.46 M/CU MM (ref 4.6–6.2)
SODIUM BLD-SCNC: 140 MMOL/L (ref 135–145)
TOTAL PROTEIN: 7.2 GM/DL (ref 6.4–8.2)
WBC # BLD: 6.6 K/CU MM (ref 4–10.5)

## 2024-08-23 PROCEDURE — 80053 COMPREHEN METABOLIC PANEL: CPT

## 2024-08-23 PROCEDURE — 84702 CHORIONIC GONADOTROPIN TEST: CPT

## 2024-08-23 PROCEDURE — 85025 COMPLETE CBC W/AUTO DIFF WBC: CPT

## 2024-08-23 PROCEDURE — 83615 LACTATE (LD) (LDH) ENZYME: CPT

## 2024-08-23 PROCEDURE — 36415 COLL VENOUS BLD VENIPUNCTURE: CPT

## 2024-08-23 PROCEDURE — 82105 ALPHA-FETOPROTEIN SERUM: CPT

## 2024-08-24 LAB — AFP-TM SERPL-MCNC: 3 NG/ML (ref 0–9)

## 2024-08-24 NOTE — PROGRESS NOTES
Patient Name:  Kenny Gonzalez  Patient :  1990  Patient MRN:  [id number]     Primary Oncologist: Philip Gray MD  Referring Provider: No primary care provider on file.     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    He initially presented with right testicular pain on last week of 2022. Subsequent US scrotum on 22 showed 35 mm right testicular mass.     His tumor marker before surgery on 22 were, AFP 6, beta HCG less than 5 and .    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  Tumor size was 3.5 cm in greatest dimension and confined to testis [pT1].  All the surgical margins are negative.  There is no lymphovascular invasion.    CT scan of the chest, abdomen and pelvis done on 2022 showed no focal forward CT findings of intra-abdominal or pelvic metastatic disease. Hepatic steatosis.    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    Since he only has stage I disease without risk factor, I recommend him to consider for surveillance. I also discussed with him about one cycle of chemotherapy with BEP as well as nerve-sparing RPLND.     After long discussion with him and his father, he has decided for surveillance.     He will need history, physical and tumor marker every 2 months in first year, every 3 months in second year, every 4-6 months in third year, every 6 months in fourth year and yearly  marker done on 6/23/23 were within normal range.  I recommend him to have repeat tumor markers in 2 months. In his second year (starting from 8/2023), I will consider to check tumor marker every 3 month and CT scan every 6 month.     Reviewed with him findings on CT abdomen/pelvis and CXR done on 8/27/24. There is no sign of recurrent or metastatic disease noted on scans.     I will check his labs and tumor markers every 6 monthly in year 3 and 4. Placed lab orders for him.     He will need CT abdomen/pelvis and Chest X ray in 8/2025 (end of 3 year) and chest  X ray alone in 8/2026 (end of 4th year).     I will continue to follow him as recommended by NCCN guidelines.      In view of his family history of brain tumor, testicular cancer and colon, prostate ca, had genetic counseling and testing. It was a negative study.     I answered all his questions and concerns for today. Recent imaging and labs were reviewed and discussed with the patient.

## 2024-08-25 LAB — B-HCG SERPL-ACNC: <1 IU/L (ref 0–3)

## 2024-08-30 ENCOUNTER — HOSPITAL ENCOUNTER (OUTPATIENT)
Dept: INFUSION THERAPY | Age: 34
Discharge: HOME | End: 2024-08-30
Payer: COMMERCIAL

## 2024-08-30 ENCOUNTER — OFFICE VISIT (OUTPATIENT)
Dept: ONCOLOGY | Age: 34
End: 2024-08-30
Payer: COMMERCIAL

## 2024-08-30 VITALS
WEIGHT: 278.6 LBS | HEIGHT: 75 IN | BODY MASS INDEX: 34.64 KG/M2 | TEMPERATURE: 98.1 F | HEART RATE: 75 BPM | SYSTOLIC BLOOD PRESSURE: 129 MMHG | DIASTOLIC BLOOD PRESSURE: 78 MMHG | OXYGEN SATURATION: 94 %

## 2024-08-30 DIAGNOSIS — C62.11 MALIGNANT NEOPLASM OF DESCENDED RIGHT TESTIS (HCC): Primary | ICD-10-CM

## 2024-08-30 PROCEDURE — 99211 OFF/OP EST MAY X REQ PHY/QHP: CPT

## 2024-08-30 PROCEDURE — 99213 OFFICE O/P EST LOW 20 MIN: CPT | Performed by: INTERNAL MEDICINE

## 2024-08-30 NOTE — PROGRESS NOTES
MA Rooming Questions  Patient: Kenny Gonzalez  MRN: [id number]    Date: 8/30/2024        1. Do you have any new issues?   no         2. Do you need any refills on medications?    no    3. Have you had any imaging done since your last visit?   yes - CT abd pel and Chest xray 8/27    4. Have you been hospitalized or seen in the emergency room since your last visit here?   no    5. Did the patient have a depression screening completed today? No    No data recorded     PHQ-9 Given to (if applicable):               PHQ-9 Score (if applicable):                     [] Positive     []  Negative              Does question #9 need addressed (if applicable)                     [] Yes    []  No               Miroslava Tinoco MA

## 2025-02-21 ENCOUNTER — HOSPITAL ENCOUNTER (OUTPATIENT)
Dept: INFUSION THERAPY | Age: 35
Discharge: HOME | End: 2025-02-21
Payer: COMMERCIAL

## 2025-02-21 DIAGNOSIS — C62.11 MALIGNANT NEOPLASM OF DESCENDED RIGHT TESTIS (HCC): ICD-10-CM

## 2025-02-21 LAB
ALBUMIN SERPL-MCNC: 4.2 G/DL (ref 3.4–5)
ALBUMIN/GLOB SERPL: 1.4 {RATIO} (ref 1.1–2.2)
ALP SERPL-CCNC: 77 U/L (ref 40–129)
ALT SERPL-CCNC: 46 U/L (ref 10–40)
ANION GAP SERPL CALCULATED.3IONS-SCNC: 11 MMOL/L (ref 9–17)
AST SERPL-CCNC: 30 U/L (ref 15–37)
BASOPHILS # BLD: 0.03 K/UL
BASOPHILS NFR BLD: 1 % (ref 0–1)
BILIRUB SERPL-MCNC: 0.4 MG/DL (ref 0–1)
BUN SERPL-MCNC: 14 MG/DL (ref 7–20)
CALCIUM SERPL-MCNC: 9.8 MG/DL (ref 8.3–10.6)
CHLORIDE SERPL-SCNC: 104 MMOL/L (ref 99–110)
CO2 SERPL-SCNC: 25 MMOL/L (ref 21–32)
CREAT SERPL-MCNC: 0.8 MG/DL (ref 0.9–1.3)
EOSINOPHIL # BLD: 0.22 K/UL
EOSINOPHILS RELATIVE PERCENT: 4 % (ref 0–3)
ERYTHROCYTE [DISTWIDTH] IN BLOOD BY AUTOMATED COUNT: 12.7 % (ref 11.7–14.9)
GFR, ESTIMATED: >90 ML/MIN/1.73M2
GLUCOSE SERPL-MCNC: 92 MG/DL (ref 74–99)
HCT VFR BLD AUTO: 47.5 % (ref 42–52)
HGB BLD-MCNC: 15.8 G/DL (ref 13.5–18)
LDH SERPL-CCNC: 148 U/L (ref 100–190)
LYMPHOCYTES NFR BLD: 2.31 K/UL
LYMPHOCYTES RELATIVE PERCENT: 43 % (ref 24–44)
MCH RBC QN AUTO: 28.7 PG (ref 27–31)
MCHC RBC AUTO-ENTMCNC: 33.3 G/DL (ref 32–36)
MCV RBC AUTO: 86.2 FL (ref 78–100)
MONOCYTES NFR BLD: 0.51 K/UL
MONOCYTES NFR BLD: 10 % (ref 0–4)
NEUTROPHILS NFR BLD: 43 % (ref 36–66)
NEUTS SEG NFR BLD: 2.29 K/UL
PLATELET # BLD AUTO: 274 K/UL (ref 140–440)
PMV BLD AUTO: 9.4 FL (ref 7.5–11.1)
POTASSIUM SERPL-SCNC: 4 MMOL/L (ref 3.5–5.1)
PROT SERPL-MCNC: 7.2 G/DL (ref 6.4–8.2)
RBC # BLD AUTO: 5.51 M/UL (ref 4.6–6.2)
SODIUM SERPL-SCNC: 140 MMOL/L (ref 136–145)
WBC OTHER # BLD: 5.4 K/UL (ref 4–10.5)

## 2025-02-21 PROCEDURE — 36415 COLL VENOUS BLD VENIPUNCTURE: CPT

## 2025-02-21 PROCEDURE — 84702 CHORIONIC GONADOTROPIN TEST: CPT

## 2025-02-21 PROCEDURE — 85025 COMPLETE CBC W/AUTO DIFF WBC: CPT

## 2025-02-21 PROCEDURE — 82105 ALPHA-FETOPROTEIN SERUM: CPT

## 2025-02-21 PROCEDURE — 83615 LACTATE (LD) (LDH) ENZYME: CPT

## 2025-02-21 PROCEDURE — 80053 COMPREHEN METABOLIC PANEL: CPT

## 2025-02-22 LAB
AFP-TM SERPL-MCNC: 3 NG/ML (ref 0–9)
B-HCG SERPL-ACNC: <1 IU/L (ref 0–3)

## 2025-04-04 ENCOUNTER — OFFICE VISIT (OUTPATIENT)
Dept: ONCOLOGY | Age: 35
End: 2025-04-04
Payer: COMMERCIAL

## 2025-04-04 ENCOUNTER — HOSPITAL ENCOUNTER (OUTPATIENT)
Dept: INFUSION THERAPY | Age: 35
Discharge: HOME | End: 2025-04-04
Payer: COMMERCIAL

## 2025-04-04 VITALS
HEIGHT: 75 IN | HEART RATE: 81 BPM | TEMPERATURE: 98.9 F | DIASTOLIC BLOOD PRESSURE: 94 MMHG | OXYGEN SATURATION: 94 % | RESPIRATION RATE: 18 BRPM | WEIGHT: 292.4 LBS | SYSTOLIC BLOOD PRESSURE: 120 MMHG | BODY MASS INDEX: 36.36 KG/M2

## 2025-04-04 DIAGNOSIS — C62.11 MALIGNANT NEOPLASM OF DESCENDED RIGHT TESTIS (HCC): Primary | ICD-10-CM

## 2025-04-04 PROCEDURE — 99212 OFFICE O/P EST SF 10 MIN: CPT

## 2025-04-04 PROCEDURE — 99213 OFFICE O/P EST LOW 20 MIN: CPT | Performed by: INTERNAL MEDICINE

## 2025-04-04 ASSESSMENT — PATIENT HEALTH QUESTIONNAIRE - PHQ9
1. LITTLE INTEREST OR PLEASURE IN DOING THINGS: NOT AT ALL
SUM OF ALL RESPONSES TO PHQ QUESTIONS 1-9: 1
2. FEELING DOWN, DEPRESSED OR HOPELESS: SEVERAL DAYS
SUM OF ALL RESPONSES TO PHQ QUESTIONS 1-9: 1

## 2025-04-04 NOTE — PROGRESS NOTES
MA Rooming Questions  Patient: Kenny Gonzalez  MRN: [id number]    Date: 4/4/2025        1. Do you have any new issues?   no         2. Do you need any refills on medications?    no    3. Have you had any imaging done since your last visit?   no    4. Have you been hospitalized or seen in the emergency room since your last visit here?   no    5. Did the patient have a depression screening completed today? Yes    PHQ-9 Total Score: 1 (4/4/2025 10:29 AM)       PHQ-9 Given to (if applicable):               PHQ-9 Score (if applicable):                     [] Positive     []  Negative              Does question #9 need addressed (if applicable)                     [] Yes    []  No               Ronna Paz MA      
     On April 4, 2025, he presented to me for follow-up.  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    His tumor marker done on 6/23/23 were within normal range.  I recommend him to have repeat tumor markers in 2 months. In his second year (starting from 8/2023), I will consider to check tumor marker every 3 month and CT scan every 6 month.     Reviewed with him findings on CT abdomen/pelvis and CXR done on 8/27/24. There is no sign of recurrent or metastatic disease noted on scans.     His tumor markers on 2/21/25 were normal. I will check his labs and tumor markers every 6 monthly in year 3 and 4. Placed lab orders for him to be done on 8/22/25. I also request CT abdomen/pelvis and CXR on 8/22/25. I will see him back after that.      He will need CT abdomen/pelvis and Chest X ray in 8/2025 (end of 3 year) and chest  X ray alone in 8/2026 (end of 4th year).     I will continue to follow him as recommended by NCCN guidelines.      In view of his family history of brain tumor, testicular cancer and colon, prostate ca, had genetic counseling and testing. It was a negative study.     I answered all his questions and concerns for today. Recent imaging and labs were reviewed and discussed with the patient.

## 2025-08-04 ENCOUNTER — CLINICAL DOCUMENTATION (OUTPATIENT)
Dept: ONCOLOGY | Age: 35
End: 2025-08-04

## 2025-08-22 ENCOUNTER — HOSPITAL ENCOUNTER (OUTPATIENT)
Dept: INFUSION THERAPY | Age: 35
Discharge: HOME | End: 2025-08-22
Payer: COMMERCIAL

## 2025-08-22 DIAGNOSIS — C62.11 MALIGNANT NEOPLASM OF DESCENDED RIGHT TESTIS (HCC): ICD-10-CM

## 2025-08-22 LAB
ALBUMIN SERPL-MCNC: 4.4 G/DL (ref 3.4–5)
ALBUMIN/GLOB SERPL: 1.4 {RATIO} (ref 1.1–2.2)
ALP SERPL-CCNC: 83 U/L (ref 40–129)
ALT SERPL-CCNC: 62 U/L (ref 10–40)
ANION GAP SERPL CALCULATED.3IONS-SCNC: 15 MMOL/L (ref 9–17)
AST SERPL-CCNC: 42 U/L (ref 15–37)
BASOPHILS # BLD: 0.03 K/UL
BASOPHILS NFR BLD: 1 % (ref 0–1)
BILIRUB SERPL-MCNC: 0.5 MG/DL (ref 0–1)
BUN SERPL-MCNC: 10 MG/DL (ref 7–20)
CALCIUM SERPL-MCNC: 9.4 MG/DL (ref 8.3–10.6)
CHLORIDE SERPL-SCNC: 103 MMOL/L (ref 99–110)
CO2 SERPL-SCNC: 21 MMOL/L (ref 21–32)
CREAT SERPL-MCNC: 0.9 MG/DL (ref 0.9–1.3)
EOSINOPHIL # BLD: 0.24 K/UL
EOSINOPHILS RELATIVE PERCENT: 4 % (ref 0–3)
ERYTHROCYTE [DISTWIDTH] IN BLOOD BY AUTOMATED COUNT: 12.8 % (ref 11.7–14.9)
GFR, ESTIMATED: >90 ML/MIN/1.73M2
GLUCOSE SERPL-MCNC: 98 MG/DL (ref 74–99)
HCT VFR BLD AUTO: 49.2 % (ref 42–52)
HGB BLD-MCNC: 16.4 G/DL (ref 13.5–18)
LDH SERPL-CCNC: 213 U/L (ref 100–190)
LYMPHOCYTES NFR BLD: 2.37 K/UL
LYMPHOCYTES RELATIVE PERCENT: 38 % (ref 24–44)
MCH RBC QN AUTO: 28.9 PG (ref 27–31)
MCHC RBC AUTO-ENTMCNC: 33.3 G/DL (ref 32–36)
MCV RBC AUTO: 86.6 FL (ref 78–100)
MONOCYTES NFR BLD: 0.58 K/UL
MONOCYTES NFR BLD: 9 % (ref 0–5)
NEUTROPHILS NFR BLD: 49 % (ref 36–66)
NEUTS SEG NFR BLD: 3.07 K/UL
PLATELET # BLD AUTO: 291 K/UL (ref 140–440)
PMV BLD AUTO: 9.9 FL (ref 7.5–11.1)
POTASSIUM SERPL-SCNC: 4.7 MMOL/L (ref 3.5–5.1)
PROT SERPL-MCNC: 7.6 G/DL (ref 6.4–8.2)
RBC # BLD AUTO: 5.68 M/UL (ref 4.6–6.2)
SODIUM SERPL-SCNC: 139 MMOL/L (ref 136–145)
WBC OTHER # BLD: 6.3 K/UL (ref 4–10.5)

## 2025-08-22 PROCEDURE — 36415 COLL VENOUS BLD VENIPUNCTURE: CPT

## 2025-08-22 PROCEDURE — 85025 COMPLETE CBC W/AUTO DIFF WBC: CPT

## 2025-08-22 PROCEDURE — 82105 ALPHA-FETOPROTEIN SERUM: CPT

## 2025-08-22 PROCEDURE — 84702 CHORIONIC GONADOTROPIN TEST: CPT

## 2025-08-22 PROCEDURE — 83615 LACTATE (LD) (LDH) ENZYME: CPT

## 2025-08-22 PROCEDURE — 80053 COMPREHEN METABOLIC PANEL: CPT

## 2025-08-23 LAB — AFP-TM SERPL-MCNC: 4 NG/ML (ref 0–9)

## 2025-08-24 LAB — B-HCG SERPL-ACNC: <1 IU/L (ref 0–3)

## 2025-08-29 ENCOUNTER — OFFICE VISIT (OUTPATIENT)
Dept: ONCOLOGY | Age: 35
End: 2025-08-29
Payer: COMMERCIAL

## 2025-08-29 ENCOUNTER — HOSPITAL ENCOUNTER (OUTPATIENT)
Dept: INFUSION THERAPY | Age: 35
Discharge: HOME | End: 2025-08-29
Payer: COMMERCIAL

## 2025-08-29 VITALS
TEMPERATURE: 98 F | OXYGEN SATURATION: 96 % | SYSTOLIC BLOOD PRESSURE: 148 MMHG | HEIGHT: 75 IN | HEART RATE: 80 BPM | WEIGHT: 285 LBS | DIASTOLIC BLOOD PRESSURE: 98 MMHG | BODY MASS INDEX: 35.43 KG/M2

## 2025-08-29 DIAGNOSIS — C62.11 MALIGNANT NEOPLASM OF DESCENDED RIGHT TESTIS (HCC): Primary | ICD-10-CM

## 2025-08-29 DIAGNOSIS — Z80.8 FAMILY HISTORY OF GLIOBLASTOMA: ICD-10-CM

## 2025-08-29 DIAGNOSIS — Z80.42 FAMILY HISTORY OF PROSTATE CANCER: ICD-10-CM

## 2025-08-29 PROCEDURE — 99213 OFFICE O/P EST LOW 20 MIN: CPT | Performed by: INTERNAL MEDICINE

## 2025-08-29 PROCEDURE — 99212 OFFICE O/P EST SF 10 MIN: CPT
